# Patient Record
Sex: MALE | Race: WHITE | NOT HISPANIC OR LATINO | Employment: FULL TIME | URBAN - METROPOLITAN AREA
[De-identification: names, ages, dates, MRNs, and addresses within clinical notes are randomized per-mention and may not be internally consistent; named-entity substitution may affect disease eponyms.]

---

## 2020-05-19 ENCOUNTER — TELEPHONE (OUTPATIENT)
Dept: SCHEDULING | Facility: IMAGING CENTER | Age: 35
End: 2020-05-19

## 2020-06-08 ENCOUNTER — OFFICE VISIT (OUTPATIENT)
Dept: MEDICAL GROUP | Facility: PHYSICIAN GROUP | Age: 35
End: 2020-06-08
Payer: COMMERCIAL

## 2020-06-08 VITALS
OXYGEN SATURATION: 96 % | HEART RATE: 76 BPM | WEIGHT: 283 LBS | DIASTOLIC BLOOD PRESSURE: 80 MMHG | HEIGHT: 69 IN | RESPIRATION RATE: 20 BRPM | SYSTOLIC BLOOD PRESSURE: 128 MMHG | BODY MASS INDEX: 41.92 KG/M2 | TEMPERATURE: 97.4 F

## 2020-06-08 DIAGNOSIS — E78.5 DYSLIPIDEMIA: ICD-10-CM

## 2020-06-08 DIAGNOSIS — Z00.00 PHYSICAL EXAM, ANNUAL: ICD-10-CM

## 2020-06-08 DIAGNOSIS — K76.0 FATTY LIVER: ICD-10-CM

## 2020-06-08 DIAGNOSIS — E66.01 CLASS 3 SEVERE OBESITY DUE TO EXCESS CALORIES WITHOUT SERIOUS COMORBIDITY WITH BODY MASS INDEX (BMI) OF 40.0 TO 44.9 IN ADULT (HCC): ICD-10-CM

## 2020-06-08 DIAGNOSIS — E11.9 TYPE 2 DIABETES MELLITUS WITHOUT COMPLICATION, WITHOUT LONG-TERM CURRENT USE OF INSULIN (HCC): ICD-10-CM

## 2020-06-08 PROBLEM — E66.813 CLASS 3 SEVERE OBESITY DUE TO EXCESS CALORIES WITHOUT SERIOUS COMORBIDITY WITH BODY MASS INDEX (BMI) OF 40.0 TO 44.9 IN ADULT (HCC): Status: ACTIVE | Noted: 2020-06-08

## 2020-06-08 LAB
HBA1C MFR BLD: 6.9 % (ref 0–5.6)
INT CON NEG: NEGATIVE
INT CON POS: POSITIVE

## 2020-06-08 PROCEDURE — 83036 HEMOGLOBIN GLYCOSYLATED A1C: CPT | Performed by: PHYSICIAN ASSISTANT

## 2020-06-08 PROCEDURE — 99204 OFFICE O/P NEW MOD 45 MIN: CPT | Performed by: PHYSICIAN ASSISTANT

## 2020-06-08 RX ORDER — VILAZODONE HYDROCHLORIDE 40 MG/1
40 TABLET ORAL DAILY
COMMUNITY
End: 2021-07-13 | Stop reason: SDUPTHER

## 2020-06-08 RX ORDER — TOPIRAMATE 100 MG/1
100 TABLET, FILM COATED ORAL DAILY
COMMUNITY
End: 2021-04-19

## 2020-06-08 RX ORDER — ATORVASTATIN CALCIUM 10 MG/1
10 TABLET, FILM COATED ORAL DAILY
Qty: 90 TAB | Refills: 0 | Status: SHIPPED | OUTPATIENT
Start: 2020-06-08 | End: 2020-10-05

## 2020-06-08 RX ORDER — LIOTHYRONINE SODIUM 5 UG/1
15 TABLET ORAL DAILY
COMMUNITY
End: 2021-04-19

## 2020-06-08 RX ORDER — LAMOTRIGINE 100 MG/1
100 TABLET ORAL 2 TIMES DAILY
COMMUNITY
End: 2021-07-13 | Stop reason: SDUPTHER

## 2020-06-08 ASSESSMENT — PATIENT HEALTH QUESTIONNAIRE - PHQ9: CLINICAL INTERPRETATION OF PHQ2 SCORE: 0

## 2020-06-08 NOTE — PROGRESS NOTES
CC:    Chief Complaint   Patient presents with   • [facility] Care       HISTORY OF THE PRESENT ILLNESS: Patient is a 35 y.o. male presenting to [facility] primary care     1. Pt has bipolar type 2 that is managed by psych.   2. Pt had blood work done 5 months ago that had abnormal labs. He notes that his fasting glucose was 170.  Patient's labs indicate that he had a fasting glucose of 173 and hemoglobin A1c of 6.1%.   3. Pt is prediabetic and has been on metformin once daily for years.  His hemoglobin A1c today is 6.9%.  4. Pt takes liothyronine 5mcg. He does not remember who started him on it and his TSH was on the low end of normal. He was having fatigue and weight gain so he was started on medication.   5. Pt reports severe RIK. He just had a sleep study last week and will be switching to BIPAP machine.   6. Pt has hx of elevated enzymes since he was a teenager. He had work up and diagnosed with fatty liver disease.     No problem-specific Assessment & Plan notes found for this encounter.    Allergies: Patient has no known allergies.    Current Outpatient Medications Ordered in Epic   Medication Sig Dispense Refill   • metFORMIN (GLUCOPHAGE) 500 MG Tab Take 500 mg by mouth every day.     • lamoTRIgine (LAMICTAL) 100 MG Tab Take 100 mg by mouth 2 times a day. 100mg am and 200mg pm     • topiramate (TOPAMAX) 100 MG Tab Take 100 mg by mouth every day. 3 tabs qhs     • liothyronine (CYTOMEL) 5 MCG Tab Take 5 mcg by mouth every day. 3 tabs once day     • Vilazodone HCl (VIIBRYD) 40 MG Tab Take 40 mg by mouth every day.     • VITAMIN D PO Take 2,000 mg by mouth.       No current Epic-ordered facility-administered medications on file.        Past Medical History:   Diagnosis Date   • Bipolar 2 disorder (HCC)    • Diabetes (HCC)    • Sleep apnea    • Thyroid disease        History reviewed. No pertinent surgical history.    Social History     Tobacco Use   • Smoking status: Former Smoker     Last attempt to quit:  "2014     Years since quittin.0   • Smokeless tobacco: Never Used   Substance Use Topics   • Alcohol use: Not Currently     Comment: rare   • Drug use: Never       Social History     Social History Narrative   • Not on file       Family History   Problem Relation Age of Onset   • No Known Problems Mother        ROS:     - Constitutional: Negative for fever, chills, unexpected weight change, and fatigue/generalized weakness.     - HEENT: Negative for headaches, vision changes, hearing changes, ear pain, ear discharge, rhinorrhea, sinus congestion, sore throat, and neck pain.      - Respiratory: Negative for cough, sputum production, chest congestion, dyspnea, wheezing, and crackles.      - Cardiovascular: Negative for chest pain, palpitations, orthopnea, and bilateral lower extremity edema.     - Gastrointestinal: Negative for heartburn, nausea, vomiting, abdominal pain, hematochezia, melena, diarrhea, constipation, and greasy/foul-smelling stools.     - Genitourinary: Negative for dysuria, polyuria, hematuria, pyuria, urinary urgency, and urinary incontinence.     - Musculoskeletal:Positive for b/l knee pain. Negative for myalgias, back pain    - Skin: Negative for rash, itching, cyanotic skin color change.     - Neurological: Negative for dizziness, tingling, tremors, focal sensory deficit, focal weakness and headaches.     - Endo/Heme/Allergies: Does not bruise/bleed easily.     - Psychiatric/Behavioral: Positive for anxiety and depression. Negative forsuicidal/homicidal ideation and memory loss.        - NOTE: All other systems reviewed and are negative, except as in HPI.          Exam: /80 (BP Location: Left arm, Patient Position: Sitting, BP Cuff Size: Adult long)   Pulse 76   Temp 36.3 °C (97.4 °F) (Temporal)   Resp 20   Ht 1.74 m (5' 8.5\")   Wt (!) 128.4 kg (283 lb)   SpO2 96%  Body mass index is 42.4 kg/m².    General: Normal appearing. No acute distress.Obese.   Skin: Warm and dry.  No " obvious lesions.  HEENT: Normocephalic. Eyes conjunctiva clear lids without ptosis, ears normal shape and contour  Cardiovascular: Regular rate and rhythm without murmur.   Respiratory: Clear to auscultation bilaterally, no rhonchi wheezing or rales.  Neurologic: Grossly nonfocal, A&O x3, gait normal,  Musculoskeletal: No deformity or swelling.   Extremities: No extremity cyanosis, clubbing, or edema.  Psych: Normal mood and affect. Judgment and insight is normal.    Please note that this dictation was created using voice recognition software. I have made every reasonable attempt to correct obvious errors, but I expect that there are errors of grammar and possibly content that I did not discover before finalizing the note.    LABS: 6/8/2020: Results reviewed and discussed with the patient, questions answered.      Assessment/Plan    1. Type 2 diabetes mellitus without complication, without long-term current use of insulin (HCC)  I explained to him that he is now full-fledged diabetic with a hemoglobin A1c of 6.9%.  I am going to increase his metformin to twice daily and refer him to the weight loss clinic.  I am optimistic that he will be able to reverse his diabetes and get off of his cholesterol and diabetes medications if he drops weight.  - REFERRAL TO Replaced by [facility] (HIP) Services Requested: [facility] Staff to Evaluate Best Program; Reason for Visit: Overweight/Obesity, Medical Condition Requiring Nutrition Counseling  - POCT Hemoglobin A1C  - metFORMIN (GLUCOPHAGE) 500 MG Tab; Take 1 Tab by mouth 2 times a day, with meals for 90 days.  Dispense: 180 Tab; Refill: 0    2. Dyslipidemia  I will start him on low-dose Lipitor for now and recheck his lipids in 3 months.  - Lipid Profile; Future  - atorvastatin (LIPITOR) 10 MG Tab; Take 1 Tab by mouth every day for 90 days.  Dispense: 90 Tab; Refill: 0    3. Class 3 severe obesity due to excess calories without serious comorbidity with body mass  index (BMI) of 40.0 to 44.9 in adult (HCC)  Referral to health improvement program sent  - REFERRAL TO [facility] IMPROVEMENT PROGRAMS (HIP) Services Requested: General-HIP Staff to Evaluate Best Program; Reason for Visit: Overweight/Obesity, Medical Condition Requiring Nutrition Counseling    4. Physical exam, annual  Annual labs printed.  I will see him again in 3 months  - Lipid Profile; Future  - TSH WITH REFLEX TO FT4; Future    5. Fatty liver  Explained to him that weight loss will improve his fatty liver.

## 2020-07-07 ENCOUNTER — OFFICE VISIT (OUTPATIENT)
Dept: HEALTH INFORMATION MANAGEMENT | Facility: MEDICAL CENTER | Age: 35
End: 2020-07-07
Payer: COMMERCIAL

## 2020-07-07 VITALS
OXYGEN SATURATION: 96 % | WEIGHT: 278.7 LBS | HEART RATE: 69 BPM | BODY MASS INDEX: 41.28 KG/M2 | DIASTOLIC BLOOD PRESSURE: 74 MMHG | HEIGHT: 69 IN | SYSTOLIC BLOOD PRESSURE: 118 MMHG

## 2020-07-07 DIAGNOSIS — F41.9 ANXIETY: ICD-10-CM

## 2020-07-07 DIAGNOSIS — G47.33 OSA (OBSTRUCTIVE SLEEP APNEA): ICD-10-CM

## 2020-07-07 DIAGNOSIS — E66.01 CLASS 3 SEVERE OBESITY DUE TO EXCESS CALORIES WITHOUT SERIOUS COMORBIDITY WITH BODY MASS INDEX (BMI) OF 40.0 TO 44.9 IN ADULT (HCC): ICD-10-CM

## 2020-07-07 DIAGNOSIS — K76.0 FATTY LIVER: ICD-10-CM

## 2020-07-07 DIAGNOSIS — E55.9 VITAMIN D DEFICIENCY: ICD-10-CM

## 2020-07-07 DIAGNOSIS — E11.9 TYPE 2 DIABETES MELLITUS WITHOUT COMPLICATION, WITHOUT LONG-TERM CURRENT USE OF INSULIN (HCC): ICD-10-CM

## 2020-07-07 DIAGNOSIS — E78.5 DYSLIPIDEMIA: ICD-10-CM

## 2020-07-07 DIAGNOSIS — R53.82 CHRONIC FATIGUE: ICD-10-CM

## 2020-07-07 DIAGNOSIS — R74.01 ELEVATED ALT MEASUREMENT: ICD-10-CM

## 2020-07-07 PROCEDURE — 99999 PR NO CHARGE: CPT | Performed by: INTERNAL MEDICINE

## 2020-07-07 PROCEDURE — 93000 ELECTROCARDIOGRAM COMPLETE: CPT | Performed by: INTERNAL MEDICINE

## 2020-07-07 PROCEDURE — 99204 OFFICE O/P NEW MOD 45 MIN: CPT | Performed by: INTERNAL MEDICINE

## 2020-07-07 NOTE — PROGRESS NOTES
Bariatric Medicine H&P  Chief Complaint   Patient presents with   • Weight Gain       Referred by:  Roberto Barakat P.A.*    History of Present Illness:   Leroy Naylor is a 35 y.o.  male who presents for weight management and to help address co-morbidities caused by overweight, as below.    Patient wants to feel better about himself, set an example for his daughter.  He works long hours when he works, cannot eat all day during those times and has signed an agreement that he will not take breaks.  He has tried medical weight loss with Topamax, has been on for 3 years to no effect.  Diet and exercise, calorie counting has been easiest.  He has used my fitness pal in 2019, intake was around 1400 kcals per day but has not sustain that long-term.  He admits he eats a lot of carbohydrates, fast food after work and when he is not working.  He is too tired to cook meals.    He is interested in trying anti-obesity medication if he could afford it but has financial constraints.  He is interested in meal replacement shakes which he might be able to work and do his work schedule.    Brief Diet History (see also RD notes):  AM: Usually skips  Lunch: Frozen meal or takeout  Dinner: Frozen meal or takeout  Only eats dinner on workdays, no other meals  Snacks: Nuts, granola, pretzels, crackers  Drinks: Coffee, energy drinks    HLD: On daily statin, just started  T2DM: On metformin just increased to twice daily  Fatty liver: Elevated ALT 59, 1/2020    Behavior-Related History:  Binge eating screen: Positive at times  H/o abuse: Negative     Exercise:   Likes to walk his dogs and hike, about once a week     Review of Systems   Positive for chronic fatigue, lack of energy, depression, anxiety  Sleep apnea screen: Strongly positive, waiting for better fitting CPAP  All other ROS were reviewed with patient today and are negative.      PMH/PSH:  I have reviewed the patient's medical, social and family history, allergies,  "and medications today.  Prior records reviewed.  Personal Hx of Bariatric Surgery: Negative  His mother had bariatric surgery, had a positive experience but patient does not want to consider at this time      Physical Exam:   /74 (BP Location: Left arm, Patient Position: Sitting, BP Cuff Size: Large adult long)   Pulse 69   Ht 1.753 m (5' 9\")   Wt (!) 126.4 kg (278 lb 11.2 oz)   SpO2 96%   BMI 41.16 kg/m²   Waist Measurement   Waist: 52.75 inch/inches  Body fat % 43.7  REE 2323 kcal/day    Constitutional: Oriented to person, place, and time and well-developed, well-nourished, and in no distress.    HENT: No facial plethora.  No Cushingoid features.  No scalloped tongue.  No dental erosions.  No swollen parotids.  Head: Normocephalic.   Eyes: EOM are normal. Pupils are equal, round, and reactive to light. No periorbital edema.  No lateral thinning of eyebrows.  No vertical nystagmus.  Neck: Normal range of motion. Neck supple. No thyromegaly present. No buffalo hump.  Cardiovascular: Normal rate and regular rhythm.  No murmur heard.  Pulmonary/Chest: Effort normal and breath sounds normal. No wheezes.   Abdominal: Soft. Bowel sounds are normal.  Grade 2 pannus.  No ascites.  No hepatosplenomegaly.   Musculoskeletal: Normal range of motion. No edema.   Neurological: Alert and oriented to person, place, and time. Normal reflexes. No cranial nerve deficit. No muscle weakness.  Gait normal.   Skin: Warm and dry. Not diaphoretic. No hirsuitism.  No acanthosis nigricans.  Not excessively dry, scaly.  No acne.  No bruising/ecchymosis.   No xanthomas or acrochordon.  Multiple arm tattoos  Psychiatric: Mood, memory, affect and judgment normal.     Laboratory:   Prior labs reviewed.  EKG: Sinus bradycardia, rate 54, no ST-T abnormalities.  Corrected QT 0.381  Ordered, performed in our office today, and reviewed by me today.    Dietitian Assessment: I have reviewed the Dietitian's assessment " related to this encounter.       ASSESSMENT/PLAN:  Body mass index is 41.16 kg/m².   Obesity Stage ([place]) 2; Class 3    1. Class 3 severe obesity due to excess calories without serious comorbidity with body mass index (BMI) of 40.0 to 44.9 in adult (HCC)     2. Dyslipidemia  EKG   3. Type 2 diabetes mellitus without complication, without long-term current use of insulin (HCC)     4. Fatty liver     5. RIK (obstructive sleep apnea)     6. Anxiety     7. Chronic fatigue  EKG   8. Elevated ALT measurement     9. Vitamin D deficiency         Patient has several significant comorbidities related to his chronic overweight.  He finds it difficult to address these given his work schedule and cannot change his work schedule at this time.  Start tracking if possible, work on refined CHO reduction, work on timing of meals if possible.  Consider GLP-1 agonist given T2DM but just increase metformin.  Continue statin, Vit D supp.  Monitor sleep, fatigue, LFTs, mood he progresses through the program.    The patient and I have discussed at length and agree to the following recommendations, which are all addressing the above diagnoses:    Weight Goal: 5% wt loss at one month after start (pt goal weight is 190 lb)  Diet:   12-hour work schedule, cannot eat during those hours  MR: Try 1 ND every morning or equivalent to start  High Protein/Low Carb Meals and 2 snacks between meals daily  >100 g protein, <100 g total carbs daily, less than 1800 kcal per day to start  Track daily intake with My Fitness Pal, bring to next visit  64+ oz water per day  Avoid high-calorie energy drinks, 1 meal per day p.m.  Physical Activity: Hiking, walking on days off, set goals next visit  Risk level for moderate/vigorous exercise program:   Moderate given chronic fatigue  New Rx:   Consider GLP-1 agonist pending course  Financial constraints limit use of Contrave  Behavior change:   Regular psychiatry follow-up given bipolar  Follow-up: one month  with MD, 2 wks with RD      Patient's body mass index is 41.16 kg/m². Exercise and nutrition counseling were performed at this visit.        Thank you for your referral!

## 2020-09-30 DIAGNOSIS — E78.5 DYSLIPIDEMIA: ICD-10-CM

## 2020-10-01 ENCOUNTER — HOSPITAL ENCOUNTER (OUTPATIENT)
Dept: LAB | Facility: MEDICAL CENTER | Age: 35
End: 2020-10-01
Attending: PHYSICIAN ASSISTANT
Payer: COMMERCIAL

## 2020-10-01 DIAGNOSIS — E78.5 DYSLIPIDEMIA: ICD-10-CM

## 2020-10-01 DIAGNOSIS — Z00.00 PHYSICAL EXAM, ANNUAL: ICD-10-CM

## 2020-10-01 LAB
CHOLEST SERPL-MCNC: 157 MG/DL (ref 100–199)
FASTING STATUS PATIENT QL REPORTED: NORMAL
HDLC SERPL-MCNC: 41 MG/DL
LDLC SERPL CALC-MCNC: 97 MG/DL
TRIGL SERPL-MCNC: 95 MG/DL (ref 0–149)

## 2020-10-01 PROCEDURE — 84443 ASSAY THYROID STIM HORMONE: CPT

## 2020-10-01 PROCEDURE — 80061 LIPID PANEL: CPT

## 2020-10-01 PROCEDURE — 36415 COLL VENOUS BLD VENIPUNCTURE: CPT

## 2020-10-02 LAB — TSH SERPL DL<=0.005 MIU/L-ACNC: 1.87 UIU/ML (ref 0.38–5.33)

## 2020-10-05 RX ORDER — ATORVASTATIN CALCIUM 10 MG/1
TABLET, FILM COATED ORAL
Qty: 90 TAB | Refills: 0 | Status: SHIPPED | OUTPATIENT
Start: 2020-10-05 | End: 2021-04-13

## 2020-10-06 ENCOUNTER — OFFICE VISIT (OUTPATIENT)
Dept: MEDICAL GROUP | Facility: PHYSICIAN GROUP | Age: 35
End: 2020-10-06
Payer: COMMERCIAL

## 2020-10-06 VITALS
OXYGEN SATURATION: 95 % | RESPIRATION RATE: 12 BRPM | HEIGHT: 69 IN | DIASTOLIC BLOOD PRESSURE: 80 MMHG | HEART RATE: 79 BPM | BODY MASS INDEX: 42 KG/M2 | SYSTOLIC BLOOD PRESSURE: 120 MMHG | WEIGHT: 283.6 LBS | TEMPERATURE: 97.7 F

## 2020-10-06 DIAGNOSIS — R53.82 CHRONIC FATIGUE: ICD-10-CM

## 2020-10-06 DIAGNOSIS — Z00.00 PHYSICAL EXAM, ANNUAL: ICD-10-CM

## 2020-10-06 DIAGNOSIS — G47.33 OSA (OBSTRUCTIVE SLEEP APNEA): ICD-10-CM

## 2020-10-06 DIAGNOSIS — E78.5 DYSLIPIDEMIA: ICD-10-CM

## 2020-10-06 DIAGNOSIS — E66.01 CLASS 3 SEVERE OBESITY DUE TO EXCESS CALORIES WITHOUT SERIOUS COMORBIDITY WITH BODY MASS INDEX (BMI) OF 40.0 TO 44.9 IN ADULT (HCC): ICD-10-CM

## 2020-10-06 DIAGNOSIS — E11.9 TYPE 2 DIABETES MELLITUS WITHOUT COMPLICATION, WITHOUT LONG-TERM CURRENT USE OF INSULIN (HCC): ICD-10-CM

## 2020-10-06 LAB
HBA1C MFR BLD: 6.4 % (ref 0–5.6)
INT CON NEG: NEGATIVE
INT CON POS: POSITIVE

## 2020-10-06 PROCEDURE — 99395 PREV VISIT EST AGE 18-39: CPT | Performed by: PHYSICIAN ASSISTANT

## 2020-10-06 PROCEDURE — 83036 HEMOGLOBIN GLYCOSYLATED A1C: CPT | Performed by: PHYSICIAN ASSISTANT

## 2020-10-06 RX ORDER — MODAFINIL 100 MG/1
100 TABLET ORAL DAILY
COMMUNITY
Start: 2020-09-30 | End: 2021-05-07

## 2020-10-06 NOTE — PROGRESS NOTES
CC:    Chief Complaint   Patient presents with   • Follow-Up       HISTORY OF THE PRESENT ILLNESS:  35 y.o. male presenting for annual physical exam.   1. Pt just started on Provigil 3 days ago. He has severe RIK. Helping with day time energy levels.   2. Pt's A1C today is 6.4%. Increased metformin to BID at last visit.   3. Pt saw Dr Buchanan but states that financial constraints are hindering him.   4. Pt stopped taking liothyronine.   5. Pt's lipids much improved.       No problem-specific Assessment & Plan notes found for this encounter.    Allergies: Patient has no known allergies.    Current Outpatient Medications Ordered in Epic   Medication Sig Dispense Refill   • modafinil (PROVIGIL) 100 MG Tab Take 100 mg by mouth every day. Take every morning     • atorvastatin (LIPITOR) 10 MG Tab TAKE ONE TABLET BY MOUTH DAILY 90 Tab 0   • lamoTRIgine (LAMICTAL) 100 MG Tab Take 100 mg by mouth 2 times a day. 100mg am and 200mg pm     • topiramate (TOPAMAX) 100 MG Tab Take 100 mg by mouth every day. 3 tabs qhs     • Vilazodone HCl (VIIBRYD) 40 MG Tab Take 40 mg by mouth every day.     • VITAMIN D PO Take 2,000 mg by mouth.     • liothyronine (CYTOMEL) 5 MCG Tab Take 15 mcg by mouth every day. 3 tabs once day        No current Epic-ordered facility-administered medications on file.        Past Medical History:   Diagnosis Date   • Bipolar 2 disorder (HCC)    • Diabetes (HCC)    • Sleep apnea    • Thyroid disease        History reviewed. No pertinent surgical history.    Social History     Tobacco Use   • Smoking status: Former Smoker     Quit date: 2014     Years since quittin.3   • Smokeless tobacco: Never Used   Substance Use Topics   • Alcohol use: Not Currently     Comment: rare   • Drug use: Never       Social History     Social History Narrative   • Not on file       Family History   Problem Relation Age of Onset   • No Known Problems Mother        ROS:     - Constitutional:Positive for fatigue. Negative  "for fever, chills, unexpected weight change, and generalized weakness.     - HEENT: Negative for headaches, vision changes, hearing changes, ear pain, ear discharge, rhinorrhea, sinus congestion, sore throat, and neck pain.      - Respiratory: Negative for cough, sputum production, chest congestion, dyspnea, wheezing, and crackles.      - Cardiovascular: Negative for chest pain, palpitations, orthopnea, and bilateral lower extremity edema.     - Gastrointestinal: Negative for heartburn, nausea, vomiting, abdominal pain, hematochezia, melena, diarrhea, constipation, and greasy/foul-smelling stools.     - Genitourinary: Negative for dysuria, polyuria, hematuria, pyuria, urinary urgency, and urinary incontinence.     - Musculoskeletal: Negative for myalgias, back pain, and joint pain.     - Skin: Negative for rash, itching, cyanotic skin color change.     - Neurological: Negative for dizziness, tingling, tremors, focal sensory deficit, focal weakness and headaches.     - Endo/Heme/Allergies: Does not bruise/bleed easily.     - Psychiatric/Behavioral: Positive for depression. Negative for  suicidal/homicidal ideation and memory loss.        - NOTE: All other systems reviewed and are negative, except as in HPI.      .      Exam: /80 (BP Location: Left arm, Patient Position: Sitting, BP Cuff Size: Adult)   Pulse 79   Temp 36.5 °C (97.7 °F) (Temporal)   Resp 12   Ht 1.753 m (5' 9\")   Wt (!) 128.6 kg (283 lb 9.6 oz)   SpO2 95%  Body mass index is 41.88 kg/m².    General: Normal appearing. No distress.  HEENT: Normocephalic. Eyes conjunctiva clear lids without ptosis, pupils equal and reactive to light accommodation, ears normal shape and contour, canals are clear bilaterally, tympanic membranes are benign, nasal mucosa benign, oropharynx is without erythema, edema or exudates.   Neck: Supple without JVD or bruit. No thyromegaly. No cervical lymphadenopathy  Pulmonary: Clear to ausculation.  Normal effort. No " rales, ronchi, or wheezing.  Cardiovascular: Regular rate and rhythm without murmur, gallops or rubs  Neurologic: Grossly nonfocal, gait normal, normal muscle tone  Skin: Warm and dry.  No obvious lesions.  Musculoskeletal: No extremity cyanosis, clubbing, or edema. No deformity  Psych: Normal mood and affect. Alert and oriented x3. Judgment and insight is normal.    Please note that this dictation was created using voice recognition software. I have made every reasonable attempt to correct obvious errors, but I expect that there are errors of grammar and possibly content that I did not discover before finalizing the note.    LABS: 10/6/2020: Results reviewed and discussed with the patient, questions answered.    Assessment/Plan  1. Type 2 diabetes mellitus without complication, without long-term current use of insulin (HCC)  Well-controlled with metformin twice daily.  We will continue with this and recheck again in 6 months  - POCT A1C  - POCT Retinal Eye Exam    2. RIK (obstructive sleep apnea)  Continue with Provigil.    3. Class 3 severe obesity due to excess calories without serious comorbidity with body mass index (BMI) of 40.0 to 44.9 in adult ([facility])  Continue to try to lose weight.    4. Dyslipidemia  Lipids are much improved so we will continue with Lipitor 20 mg    5. Chronic fatigue  See #2    6. Physical exam, annual  PE conducted

## 2020-12-11 DIAGNOSIS — E11.9 TYPE 2 DIABETES MELLITUS WITHOUT COMPLICATION, WITHOUT LONG-TERM CURRENT USE OF INSULIN (HCC): ICD-10-CM

## 2021-01-22 ENCOUNTER — OFFICE VISIT (OUTPATIENT)
Dept: URGENT CARE | Facility: CLINIC | Age: 36
End: 2021-01-22
Payer: COMMERCIAL

## 2021-01-22 VITALS
RESPIRATION RATE: 14 BRPM | DIASTOLIC BLOOD PRESSURE: 120 MMHG | TEMPERATURE: 98.6 F | WEIGHT: 273 LBS | BODY MASS INDEX: 40.43 KG/M2 | SYSTOLIC BLOOD PRESSURE: 170 MMHG | HEART RATE: 98 BPM | OXYGEN SATURATION: 95 % | HEIGHT: 69 IN

## 2021-01-22 DIAGNOSIS — L60.0 INGROWN TOENAIL OF RIGHT FOOT: ICD-10-CM

## 2021-01-22 DIAGNOSIS — I16.0 HYPERTENSIVE URGENCY: ICD-10-CM

## 2021-01-22 DIAGNOSIS — L08.9 TOE INFECTION: ICD-10-CM

## 2021-01-22 PROCEDURE — 99214 OFFICE O/P EST MOD 30 MIN: CPT | Performed by: PHYSICIAN ASSISTANT

## 2021-01-22 RX ORDER — SULFAMETHOXAZOLE AND TRIMETHOPRIM 800; 160 MG/1; MG/1
1 TABLET ORAL 2 TIMES DAILY
Qty: 20 TAB | Refills: 0 | Status: SHIPPED | OUTPATIENT
Start: 2021-01-22 | End: 2021-02-01

## 2021-01-22 ASSESSMENT — ENCOUNTER SYMPTOMS
BLURRED VISION: 0
HEADACHES: 1
EYE PAIN: 0
NERVOUS/ANXIOUS: 1
DIZZINESS: 1
DOUBLE VISION: 0
CHILLS: 0
FEVER: 0
PALPITATIONS: 0

## 2021-01-22 NOTE — PROGRESS NOTES
Subjective:   Leroy Naylor is a 35 y.o. male who presents today with   Chief Complaint   Patient presents with   • Toe Pain     right big toe       Toe Injury  This is a new problem. The current episode started more than 1 month ago. The problem occurs constantly. The problem has been unchanged. Associated symptoms include headaches. Pertinent negatives include no chest pain, chills or fever. Nothing aggravates the symptoms. He has tried nothing for the symptoms. The treatment provided no relief.     Patient initially comes in for right great toe pain.  Upon measuring his blood pressure it was found to be extremely high.  Remeasured the blood pressure at the end of the visit and it was still high.  Patient states he did take a course of Keflex and it did help but has come back since.  PMH:  has a past medical history of Bipolar 2 disorder (HCC), Diabetes (HCC), Sleep apnea, and Thyroid disease.  MEDS:   Current Outpatient Medications:   •  sulfamethoxazole-trimethoprim (BACTRIM DS) 800-160 MG tablet, Take 1 Tab by mouth 2 times a day for 10 days., Disp: 20 Tab, Rfl: 0  •  metFORMIN (GLUCOPHAGE) 500 MG Tab, TAKE ONE TABLET BY MOUTH TWICE A DAY WITH A MEAL, Disp: 180 Tab, Rfl: 0  •  modafinil (PROVIGIL) 100 MG Tab, Take 100 mg by mouth every day. Take every morning, Disp: , Rfl:   •  atorvastatin (LIPITOR) 10 MG Tab, TAKE ONE TABLET BY MOUTH DAILY, Disp: 90 Tab, Rfl: 0  •  lamoTRIgine (LAMICTAL) 100 MG Tab, Take 100 mg by mouth 2 times a day. 100mg am and 200mg pm, Disp: , Rfl:   •  topiramate (TOPAMAX) 100 MG Tab, Take 100 mg by mouth every day. 3 tabs qhs, Disp: , Rfl:   •  liothyronine (CYTOMEL) 5 MCG Tab, Take 15 mcg by mouth every day. 3 tabs once day , Disp: , Rfl:   •  Vilazodone HCl (VIIBRYD) 40 MG Tab, Take 40 mg by mouth every day., Disp: , Rfl:   •  VITAMIN D PO, Take 2,000 mg by mouth., Disp: , Rfl:   ALLERGIES: No Known Allergies  SURGHX: History reviewed. No pertinent surgical history.  SOCHX:   "reports that he quit smoking about 6 years ago. He has never used smokeless tobacco. He reports previous alcohol use. He reports that he does not use drugs.  FH: Reviewed with patient, not pertinent to this visit.       Review of Systems   Constitutional: Negative for chills and fever.   Eyes: Negative for blurred vision, double vision and pain.   Cardiovascular: Negative for chest pain and palpitations.   Musculoskeletal:        Right great toe pain   Neurological: Positive for dizziness and headaches.   Psychiatric/Behavioral: The patient is nervous/anxious.         Objective:   BP (!) 170/120 (BP Location: Right arm, Patient Position: Sitting)   Pulse 98   Temp 37 °C (98.6 °F)   Resp 14   Ht 1.753 m (5' 9\")   Wt 123.8 kg (273 lb)   SpO2 95%   BMI 40.32 kg/m²   Physical Exam  Vitals signs and nursing note reviewed.   Constitutional:       General: He is not in acute distress.     Appearance: Normal appearance. He is well-developed. He is not ill-appearing or toxic-appearing.   HENT:      Head: Normocephalic and atraumatic.      Right Ear: Hearing normal.      Left Ear: Hearing normal.   Eyes:      Pupils: Pupils are equal, round, and reactive to light.   Cardiovascular:      Rate and Rhythm: Normal rate and regular rhythm.      Heart sounds: Normal heart sounds.   Pulmonary:      Effort: Pulmonary effort is normal.   Musculoskeletal:        Feet:       Comments: Full ROM , Less than 2 capillary refill, NVI distally in right great toe.   Skin:     General: Skin is warm and dry.      Comments: Purulent discharge to the right great toe with pressure. TTP to medial portion or right great toe.   Neurological:      Mental Status: He is alert.      Coordination: Coordination normal.   Psychiatric:         Mood and Affect: Mood normal.       Assessment/Plan:   Assessment    1. Ingrown toenail of right foot  - REFERRAL TO PODIATRY    2. Toe infection  - sulfamethoxazole-trimethoprim (BACTRIM DS) 800-160 MG tablet; " Take 1 Tab by mouth 2 times a day for 10 days.  Dispense: 20 Tab; Refill: 0    3. Hypertensive urgency  Recommend follow-up with podiatry for ingrown toenail.  We will treat for bacterial infection given pus from the toe.  Did recommend that patient go to the ER at this time for evaluation given that his blood pressure was significantly elevated and he is symptomatic.  He is comfortable with going by private vehicle and will have a friend take him there at this time immediately.  He is understanding of potential risk of blood pressure this high including but not limited to stroke, heart attack, organ damage.  Please note that this dictation was created using voice recognition software. I have made every reasonable attempt to correct obvious errors, but I expect that there are errors of grammar and possibly content that I did not discover before finalizing the note.    Eliezer Hernandez PA-C

## 2021-01-26 ENCOUNTER — OFFICE VISIT (OUTPATIENT)
Dept: MEDICAL GROUP | Facility: PHYSICIAN GROUP | Age: 36
End: 2021-01-26
Payer: COMMERCIAL

## 2021-01-26 VITALS
WEIGHT: 282.6 LBS | OXYGEN SATURATION: 96 % | HEART RATE: 106 BPM | HEIGHT: 69 IN | RESPIRATION RATE: 16 BRPM | TEMPERATURE: 98.4 F | DIASTOLIC BLOOD PRESSURE: 88 MMHG | SYSTOLIC BLOOD PRESSURE: 150 MMHG | BODY MASS INDEX: 41.86 KG/M2

## 2021-01-26 DIAGNOSIS — F43.0 ACUTE STRESS REACTION: ICD-10-CM

## 2021-01-26 DIAGNOSIS — I10 HYPERTENSION, UNSPECIFIED TYPE: ICD-10-CM

## 2021-01-26 PROCEDURE — 99213 OFFICE O/P EST LOW 20 MIN: CPT | Performed by: PHYSICIAN ASSISTANT

## 2021-01-26 RX ORDER — LISINOPRIL 10 MG/1
10 TABLET ORAL DAILY
Qty: 60 TAB | Refills: 0 | Status: SHIPPED | OUTPATIENT
Start: 2021-01-26 | End: 2021-04-27 | Stop reason: SDUPTHER

## 2021-01-26 RX ORDER — ALPRAZOLAM 0.5 MG/1
0.5 TABLET ORAL NIGHTLY PRN
COMMUNITY
End: 2021-08-16

## 2021-01-26 ASSESSMENT — PATIENT HEALTH QUESTIONNAIRE - PHQ9: CLINICAL INTERPRETATION OF PHQ2 SCORE: 0

## 2021-01-26 NOTE — PROGRESS NOTES
CC:  Chief Complaint   Patient presents with   • Hypertension Follow-up       HISTORY OF PRESENT ILLNESS: Patient is a 35 y.o. male established patient presenting for ER f/u.  1.  Patient was seen in urgent care last week for an infection in his toe and found to have a blood pressure of 170/120.  He was instructed by the provider in the urgent care to be evaluated at the ER so he went to [facility].  His blood pressure in the ER was 119/98.  He was told by the ER physician that because he was feeling fine and his blood pressure was not that bad that he would be fine to go home without any further work-up so he left.  He has been checking his blood pressure at home and for the past several days he has been averaging in the high 150s systolic.  He has never had a previous history of high blood pressure.  2. Pt having family troubles now that are very stressful. Has been very shaky and having generalized weakness. He will be switching back to his old job. He also recently increased his provigil to 200mg about a month ago.     No problem-specific Assessment & Plan notes found for this encounter.      Patient Active Problem List    Diagnosis Date Noted   • RIK (obstructive sleep apnea) 07/07/2020   • Anxiety 07/07/2020   • Chronic fatigue 07/07/2020   • Elevated ALT measurement 07/07/2020   • Vitamin D deficiency 07/07/2020   • Type 2 diabetes mellitus without complication, without long-term current use of insulin ([facility]) 06/08/2020   • Dyslipidemia 06/08/2020   • Class 3 severe obesity due to excess calories without serious comorbidity with body mass index (BMI) of 40.0 to 44.9 in adult ([facility]) 06/08/2020   • Fatty liver 06/08/2020      Allergies:Patient has no known allergies.    Current Outpatient Medications   Medication Sig Dispense Refill   • ALPRAZolam (XANAX) 0.5 MG Tab Take 0.5 mg by mouth at bedtime as needed for Sleep.     • sulfamethoxazole-trimethoprim (BACTRIM DS) 800-160 MG tablet Take 1 Tab by mouth 2 times  a day for 10 days. 20 Tab 0   • metFORMIN (GLUCOPHAGE) 500 MG Tab TAKE ONE TABLET BY MOUTH TWICE A DAY WITH A MEAL 180 Tab 0   • modafinil (PROVIGIL) 100 MG Tab Take 100 mg by mouth every day. Take every morning 200 mg     • atorvastatin (LIPITOR) 10 MG Tab TAKE ONE TABLET BY MOUTH DAILY 90 Tab 0   • lamoTRIgine (LAMICTAL) 100 MG Tab Take 100 mg by mouth 2 times a day. 100mg am and 200mg pm     • Vilazodone HCl (VIIBRYD) 40 MG Tab Take 40 mg by mouth every day.     • topiramate (TOPAMAX) 100 MG Tab Take 100 mg by mouth every day. 3 tabs qhs     • liothyronine (CYTOMEL) 5 MCG Tab Take 15 mcg by mouth every day. 3 tabs once day      • VITAMIN D PO Take 2,000 mg by mouth.       No current facility-administered medications for this visit.        Social History     Tobacco Use   • Smoking status: Former Smoker     Quit date: 2014     Years since quittin.6   • Smokeless tobacco: Never Used   Substance Use Topics   • Alcohol use: Not Currently     Comment: rare   • Drug use: Never     Social History     Social History Narrative   • Not on file       Family History   Problem Relation Age of Onset   • No Known Problems Mother         ROS:     - Constitutional:  Negative for fever, chills, unexpected weight change, and fatigue/generalized weakness.    - HEENT:  Negative for headaches, vision changes, hearing changes, ear pain, ear discharge, rhinorrhea, sinus congestion, sore throat, and neck pain.      - Respiratory: Negative for cough, sputum production, chest congestion, dyspnea, wheezing, and crackles.      - Cardiovascular: Negative for chest pain, palpitations, orthopnea, and bilateral lower extremity edema.     - Gastrointestinal: Negative for heartburn, nausea, vomiting, abdominal pain, hematochezia, melena, diarrhea, constipation, and greasy/foul-smelling stools.      - Neurological: Negative for dizziness, tingling, tremors, focal sensory deficit, focal weakness and headaches.     - Psychiatric/Behavioral:  "Positive for acute stress.  Negative for depression, suicidal/homicidal ideation and memory loss.              Exam:    /88 (BP Location: Right arm, Patient Position: Sitting, BP Cuff Size: Large adult)   Pulse (!) 106   Temp 36.9 °C (98.4 °F) (Temporal)   Resp 16   Ht 1.753 m (5' 9\")   Wt (!) 128.2 kg (282 lb 9.6 oz)   SpO2 96%  Body mass index is 41.73 kg/m².    General:  Well nourished, well developed male in NAD  Head is grossly normal.  Neck: Supple. Thyroid is not enlarged.  Pulmonary: Clear to auscultation. No ronchi, wheezing or rales  Cardiac: Regular rate and rhythm. No murmurs.  Skin: Warm and dry. No obvious lesions  Neuro: Normal muscle tone. Gait normal. Alert and oriented.  Psych: Normal mood and affect      Please note that this dictation was created using voice recognition software. I have made every reasonable attempt to correct obvious errors, but I expect that there are errors of grammar and possibly content that I did not discover before finalizing the note.      Assessment/Plan:  1. Hypertension, unspecified type  Up until this point he has had normal blood pressure readings in our office.  However because he has been getting elevated readings at home I am going to start him on a low-dose blood pressure medication and recheck him again for this in 2 months.  At that point we will consider discontinuing it if it is not necessary.  - lisinopril (PRINIVIL) 10 MG Tab; Take 1 Tab by mouth every day for 60 days.  Dispense: 60 Tab; Refill: 0    2. Acute stress reaction  The stress in his life is likely contributing to his blood pressure.  It is also noteworthy that he recently increased his modafinil dose to 200 mg so this could also be a contributing factor.          "

## 2021-03-08 ENCOUNTER — OFFICE VISIT (OUTPATIENT)
Dept: MEDICAL GROUP | Facility: PHYSICIAN GROUP | Age: 36
End: 2021-03-08
Payer: COMMERCIAL

## 2021-03-08 VITALS
RESPIRATION RATE: 16 BRPM | HEART RATE: 82 BPM | WEIGHT: 290.7 LBS | TEMPERATURE: 97.7 F | OXYGEN SATURATION: 95 % | BODY MASS INDEX: 43.06 KG/M2 | DIASTOLIC BLOOD PRESSURE: 82 MMHG | HEIGHT: 69 IN | SYSTOLIC BLOOD PRESSURE: 132 MMHG

## 2021-03-08 DIAGNOSIS — M54.6 ACUTE RIGHT-SIDED THORACIC BACK PAIN: ICD-10-CM

## 2021-03-08 PROCEDURE — 99213 OFFICE O/P EST LOW 20 MIN: CPT | Performed by: PHYSICIAN ASSISTANT

## 2021-03-08 NOTE — PROGRESS NOTES
CC:  Chief Complaint   Patient presents with   • Back Pain     x4-5 months, rt side       HISTORY OF PRESENT ILLNESS: Patient is a 35 y.o. male established patient presenting with back pain for the past 4-5 months. It is episodic. He saw a chiropractor about 2 months ago. He was told that he has a displaced rib in his thorax. Pain in R paraspinal region. Pain has been unbearable in the past 2 weeks. Some days the pain will bring him to tears. There was no trauma or inciting incident. Has been taking tramadol and robaxin without any relief. Certain positions tend to worsen the pain. Has been using a foam roller with some relief.            Patient Active Problem List    Diagnosis Date Noted   • RIK (obstructive sleep apnea) 07/07/2020   • Anxiety 07/07/2020   • Chronic fatigue 07/07/2020   • Elevated ALT measurement 07/07/2020   • Vitamin D deficiency 07/07/2020   • Type 2 diabetes mellitus without complication, without long-term current use of insulin ([facility]) 06/08/2020   • Dyslipidemia 06/08/2020   • Class 3 severe obesity due to excess calories without serious comorbidity with body mass index (BMI) of 40.0 to 44.9 in adult ([facility]) 06/08/2020   • Fatty liver 06/08/2020      Allergies:Patient has no known allergies.    Current Outpatient Medications   Medication Sig Dispense Refill   • ALPRAZolam (XANAX) 0.5 MG Tab Take 0.5 mg by mouth at bedtime as needed for Sleep.     • lisinopril (PRINIVIL) 10 MG Tab Take 1 Tab by mouth every day for 60 days. 60 Tab 0   • metFORMIN (GLUCOPHAGE) 500 MG Tab TAKE ONE TABLET BY MOUTH TWICE A DAY WITH A MEAL 180 Tab 0   • modafinil (PROVIGIL) 100 MG Tab Take 100 mg by mouth every day. Take every morning 200 mg     • atorvastatin (LIPITOR) 10 MG Tab TAKE ONE TABLET BY MOUTH DAILY 90 Tab 0   • lamoTRIgine (LAMICTAL) 100 MG Tab Take 100 mg by mouth 2 times a day. 100mg am and 200mg pm     • topiramate (TOPAMAX) 100 MG Tab Take 100 mg by mouth every day. 3 tabs qhs     • liothyronine  "(CYTOMEL) 5 MCG Tab Take 15 mcg by mouth every day. 3 tabs once day      • Vilazodone HCl (VIIBRYD) 40 MG Tab Take 40 mg by mouth every day.     • VITAMIN D PO Take 2,000 mg by mouth.       No current facility-administered medications for this visit.       Social History     Tobacco Use   • Smoking status: Former Smoker     Quit date: 2014     Years since quittin.7   • Smokeless tobacco: Never Used   Substance Use Topics   • Alcohol use: Not Currently     Comment: rare   • Drug use: Never     Social History     Social History Narrative   • Not on file       Family History   Problem Relation Age of Onset   • No Known Problems Mother         ROS:     - Constitutional:  Negative for fever, chills, unexpected weight change, and fatigue/generalized weakness.    - HEENT:  Negative for headaches, vision changes, hearing changes, ear pain, ear discharge, rhinorrhea, sinus congestion, sore throat, and neck pain.      - Respiratory: Negative for cough, sputum production, chest congestion, dyspnea, wheezing, and crackles.      - Cardiovascular: Negative for chest pain, palpitations, orthopnea, and bilateral lower extremity edema.      - Genitourinary: Negative for dysuria, polyuria, hematuria, pyuria, urinary urgency, and urinary incontinence.     - Musculoskeletal:Positive for back pain.  Negative for myalgias, and joint pain.     - Skin: Negative for rash, itching, cyanotic skin color change.     - Neurological: Negative for dizziness, tingling, tremors, focal sensory deficit, focal weakness and headaches.                  Exam:    /82 (BP Location: Left arm, Patient Position: Sitting, BP Cuff Size: Adult long)   Pulse 82   Temp 36.5 °C (97.7 °F) (Temporal)   Resp 16   Ht 1.753 m (5' 9\")   Wt (!) 132 kg (290 lb 11.2 oz)   SpO2 95%  Body mass index is 42.93 kg/m².    General:  Well nourished, well developed male in NAD  Head is grossly normal.  Neck: Supple. Thyroid is not enlarged.  Pulmonary: no " accessory muscle use  Back: no tenderness, area of discomfort in T9-T10 R paraspinal region  Skin: Warm and dry. No obvious lesions  Neuro: Normal muscle tone. Gait normal. Alert and oriented.  Psych: Normal mood and affect      Please note that this dictation was created using voice recognition software. I have made every reasonable attempt to correct obvious errors, but I expect that there are errors of grammar and possibly content that I did not discover before finalizing the note.        Assessment/Plan:  1. Acute right-sided thoracic back pain  This is a problem that waxes and wanes in intensity and seems to be worsening overall.  I am going to proceed with imaging and set up orthopedic consult.  - DX-THORACIC SPINE-2 VIEWS; Future  - MR-THORACIC SPINE-W/O; Future  - REFERRAL TO ORTHOPEDICS

## 2021-03-09 ENCOUNTER — APPOINTMENT (OUTPATIENT)
Dept: RADIOLOGY | Facility: IMAGING CENTER | Age: 36
End: 2021-03-09
Attending: PHYSICIAN ASSISTANT
Payer: COMMERCIAL

## 2021-03-09 ENCOUNTER — APPOINTMENT (OUTPATIENT)
Dept: URGENT CARE | Facility: CLINIC | Age: 36
End: 2021-03-09
Payer: COMMERCIAL

## 2021-03-09 DIAGNOSIS — M54.6 ACUTE RIGHT-SIDED THORACIC BACK PAIN: ICD-10-CM

## 2021-03-09 PROCEDURE — 72070 X-RAY EXAM THORAC SPINE 2VWS: CPT | Mod: TC | Performed by: PHYSICIAN ASSISTANT

## 2021-04-12 DIAGNOSIS — E78.5 DYSLIPIDEMIA: ICD-10-CM

## 2021-04-14 RX ORDER — ATORVASTATIN CALCIUM 10 MG/1
TABLET, FILM COATED ORAL
Qty: 90 TABLET | Refills: 0 | Status: SHIPPED | OUTPATIENT
Start: 2021-04-14 | End: 2021-12-21 | Stop reason: SDUPTHER

## 2021-04-19 ENCOUNTER — OFFICE VISIT (OUTPATIENT)
Dept: MEDICAL GROUP | Facility: PHYSICIAN GROUP | Age: 36
End: 2021-04-19
Payer: COMMERCIAL

## 2021-04-19 VITALS
WEIGHT: 299.2 LBS | SYSTOLIC BLOOD PRESSURE: 130 MMHG | RESPIRATION RATE: 16 BRPM | OXYGEN SATURATION: 95 % | HEART RATE: 99 BPM | DIASTOLIC BLOOD PRESSURE: 84 MMHG | TEMPERATURE: 96 F | HEIGHT: 69 IN | BODY MASS INDEX: 44.31 KG/M2

## 2021-04-19 DIAGNOSIS — E11.9 TYPE 2 DIABETES MELLITUS WITHOUT COMPLICATION, WITHOUT LONG-TERM CURRENT USE OF INSULIN (HCC): ICD-10-CM

## 2021-04-19 DIAGNOSIS — M54.6 ACUTE RIGHT-SIDED THORACIC BACK PAIN: ICD-10-CM

## 2021-04-19 DIAGNOSIS — I10 ESSENTIAL HYPERTENSION: ICD-10-CM

## 2021-04-19 DIAGNOSIS — Z00.00 PHYSICAL EXAM, ANNUAL: ICD-10-CM

## 2021-04-19 LAB
HBA1C MFR BLD: 6.9 % (ref 0–5.6)
INT CON NEG: NEGATIVE
INT CON POS: POSITIVE

## 2021-04-19 PROCEDURE — 99214 OFFICE O/P EST MOD 30 MIN: CPT | Performed by: PHYSICIAN ASSISTANT

## 2021-04-19 PROCEDURE — 83036 HEMOGLOBIN GLYCOSYLATED A1C: CPT | Performed by: PHYSICIAN ASSISTANT

## 2021-04-19 RX ORDER — MELOXICAM 7.5 MG/1
7.5 TABLET ORAL DAILY
COMMUNITY
End: 2021-08-16

## 2021-04-19 RX ORDER — CYCLOBENZAPRINE HCL 10 MG
10 TABLET ORAL PRN
COMMUNITY
End: 2021-05-07

## 2021-04-19 NOTE — PROGRESS NOTES
CC:  Chief Complaint   Patient presents with   • Follow-Up       HISTORY OF PRESENT ILLNESS: Patient is a 35 y.o. male established patient presenting for follow-up  1. Pt's back pain has been OK in past few weeks. He had appt with [facility] and was told that he should do physical therapy. His first PT appt is today. Pain continues to wax and wane.   2. Pt has continued to take lisinopril daily. His home readings have been good.   3. Pt's A1C today is 6.9%. currently on metformin 500mg BID.   4. Patient continues to have a lot of stress at home.    No problem-specific Assessment & Plan notes found for this encounter.      Patient Active Problem List    Diagnosis Date Noted   • RIK (obstructive sleep apnea) 07/07/2020   • Anxiety 07/07/2020   • Chronic fatigue 07/07/2020   • Elevated ALT measurement 07/07/2020   • Vitamin D deficiency 07/07/2020   • Type 2 diabetes mellitus without complication, without long-term current use of insulin ([facility]) 06/08/2020   • Dyslipidemia 06/08/2020   • Class 3 severe obesity due to excess calories without serious comorbidity with body mass index (BMI) of 40.0 to 44.9 in adult ([facility]) 06/08/2020   • Fatty liver 06/08/2020      Allergies:Patient has no known allergies.    Current Outpatient Medications   Medication Sig Dispense Refill   • meloxicam (MOBIC) 7.5 MG Tab Take 7.5 mg by mouth every day.     • cyclobenzaprine (FLEXERIL) 10 mg Tab Take 10 mg by mouth as needed.     • atorvastatin (LIPITOR) 10 MG Tab TAKE ONE TABLET BY MOUTH DAILY 90 tablet 0   • ALPRAZolam (XANAX) 0.5 MG Tab Take 0.5 mg by mouth at bedtime as needed for Sleep.     • metFORMIN (GLUCOPHAGE) 500 MG Tab TAKE ONE TABLET BY MOUTH TWICE A DAY WITH A MEAL 180 Tab 0   • modafinil (PROVIGIL) 100 MG Tab Take 100 mg by mouth every day. Take every morning 200 mg     • lamoTRIgine (LAMICTAL) 100 MG Tab Take 100 mg by mouth 2 times a day. 100mg am and 200mg pm     • Vilazodone HCl (VIIBRYD) 40 MG Tab Take 40 mg by mouth  every day.     • topiramate (TOPAMAX) 100 MG Tab Take 100 mg by mouth every day. 3 tabs qhs     • liothyronine (CYTOMEL) 5 MCG Tab Take 15 mcg by mouth every day. 3 tabs once day      • VITAMIN D PO Take 2,000 mg by mouth.       No current facility-administered medications for this visit.       Social History     Tobacco Use   • Smoking status: Former Smoker     Quit date: 2014     Years since quittin.8   • Smokeless tobacco: Never Used   Substance Use Topics   • Alcohol use: Not Currently     Comment: rare   • Drug use: Never     Social History     Social History Narrative   • Not on file       Family History   Problem Relation Age of Onset   • No Known Problems Mother         ROS:     - Constitutional:  Negative for fever, chills, unexpected weight change, and fatigue/generalized weakness.    - HEENT:  Negative for headaches, vision changes, hearing changes, ear pain, ear discharge, rhinorrhea, sinus congestion, sore throat, and neck pain.      - Respiratory: Negative for cough, sputum production, chest congestion, dyspnea, wheezing, and crackles.      - Cardiovascular: Negative for chest pain, palpitations, orthopnea, and bilateral lower extremity edema.     - Gastrointestinal: Negative for heartburn, nausea, vomiting, abdominal pain, hematochezia, melena, diarrhea, constipation, and greasy/foul-smelling stools.     - Genitourinary: Negative for dysuria, polyuria, hematuria, pyuria, urinary urgency, and urinary incontinence.     - Musculoskeletal: Positive for back pain.  Negative for myalgias,  and joint pain.     - Skin: Negative for rash, itching, cyanotic skin color change.     - Neurological: Negative for dizziness, tingling, tremors, focal sensory deficit, focal weakness and headaches.     - Endo/Heme/Allergies: Does not bruise/bleed easily.     - Psychiatric/Behavioral: Positive for acute stress and anxiety.  Negative for depression, suicidal/homicidal ideation and memory loss.          - NOTE: All  "other systems reviewed and are negative, except as in HPI.      Exam:    /84 (BP Location: Right arm, Patient Position: Sitting, BP Cuff Size: Adult)   Pulse 99   Temp (!) 35.6 °C (96 °F) (Temporal)   Resp 16   Ht 1.753 m (5' 9\")   Wt (!) 136 kg (299 lb 3.2 oz)   SpO2 95%  Body mass index is 44.18 kg/m².    General:  Well nourished, well developed male in NAD  Head is grossly normal.  Neck: Supple. Thyroid is not enlarged.  Pulmonary: Clear to auscultation. No ronchi, wheezing or rales  Cardiac: Regular rate and rhythm. No murmurs.  Skin: Warm and dry. No obvious lesions  Neuro: Normal muscle tone. Gait normal. Alert and oriented.  Monofilament exam normal  Psych: Normal mood and affect      Please note that this dictation was created using voice recognition software. I have made every reasonable attempt to correct obvious errors, but I expect that there are errors of grammar and possibly content that I did not discover before finalizing the note.    LABS: 4/19/2021: Results reviewed and discussed with the patient, questions answered.    Assessment/Plan:  1. Physical exam, annual  Annual labs printed  - CBC WITH DIFFERENTIAL; Future  - Comp Metabolic Panel; Future  - Lipid Profile; Future    2. Type 2 diabetes mellitus without complication, without long-term current use of insulin (HCC)  His diabetes is borderline high but still controlled at this point.  We will check this again in 6 months  - MICROALBUMIN CREAT RATIO URINE; Future  - POCT A1C  - Diabetic Monofilament Lower Extremity Exam    3. Essential hypertension  Blood pressure is very well controlled    4. Acute right-sided thoracic back pain  Continue to follow-up with physical therapy and orthopedics.          "

## 2021-04-27 ENCOUNTER — PATIENT MESSAGE (OUTPATIENT)
Dept: MEDICAL GROUP | Facility: PHYSICIAN GROUP | Age: 36
End: 2021-04-27

## 2021-04-27 DIAGNOSIS — I10 HYPERTENSION, UNSPECIFIED TYPE: ICD-10-CM

## 2021-04-27 DIAGNOSIS — E11.9 TYPE 2 DIABETES MELLITUS WITHOUT COMPLICATION, WITHOUT LONG-TERM CURRENT USE OF INSULIN (HCC): ICD-10-CM

## 2021-04-27 NOTE — PATIENT COMMUNICATION
Received request via: Patient    Was the patient seen in the last year in this department? Yes    Does the patient have an active prescription (recently filled or refills available) for medication(s) requested? No     Requested Prescriptions     Pending Prescriptions Disp Refills   • metFORMIN (GLUCOPHAGE) 500 MG Tab       Sig: Take  by mouth.   • lisinopril (PRINIVIL) 10 MG Tab 60 tablet 0     Sig: Take 1 tablet by mouth every day for 60 days.

## 2021-04-28 RX ORDER — LISINOPRIL 10 MG/1
10 TABLET ORAL DAILY
Qty: 60 TABLET | Refills: 0 | Status: SHIPPED | OUTPATIENT
Start: 2021-04-28 | End: 2021-06-27

## 2021-05-06 ENCOUNTER — APPOINTMENT (OUTPATIENT)
Dept: URGENT CARE | Facility: CLINIC | Age: 36
End: 2021-05-06
Payer: COMMERCIAL

## 2021-05-07 ENCOUNTER — OFFICE VISIT (OUTPATIENT)
Dept: MEDICAL GROUP | Facility: PHYSICIAN GROUP | Age: 36
End: 2021-05-07
Payer: COMMERCIAL

## 2021-05-07 VITALS
SYSTOLIC BLOOD PRESSURE: 128 MMHG | RESPIRATION RATE: 16 BRPM | DIASTOLIC BLOOD PRESSURE: 70 MMHG | BODY MASS INDEX: 44.2 KG/M2 | HEIGHT: 69 IN | WEIGHT: 298.4 LBS | TEMPERATURE: 97.7 F | OXYGEN SATURATION: 95 %

## 2021-05-07 DIAGNOSIS — K92.0 HEMATEMESIS WITH NAUSEA: ICD-10-CM

## 2021-05-07 PROCEDURE — 99213 OFFICE O/P EST LOW 20 MIN: CPT | Performed by: PHYSICIAN ASSISTANT

## 2021-05-07 NOTE — PROGRESS NOTES
CC:  Chief Complaint   Patient presents with   • Follow-Up     throwing up blood, in the morning,        HISTORY OF PRESENT ILLNESS: Patient is a 35 y.o. male established patient presenting with hematemasis that started yesterday. Had some nausea and abd pain that started yesterday. Was taking advil often for his back pain and now is on meloxicam. Feels improved today. Does not drink EtOH. Went to urgent care yesterday and was told they could not see him there for this.       No problem-specific Assessment & Plan notes found for this encounter.      Patient Active Problem List    Diagnosis Date Noted   • Acute right-sided thoracic back pain 04/19/2021   • RIK (obstructive sleep apnea) 07/07/2020   • Anxiety 07/07/2020   • Chronic fatigue 07/07/2020   • Elevated ALT measurement 07/07/2020   • Vitamin D deficiency 07/07/2020   • Type 2 diabetes mellitus without complication, without long-term current use of insulin ([facility]) 06/08/2020   • Dyslipidemia 06/08/2020   • Class 3 severe obesity due to excess calories without serious comorbidity with body mass index (BMI) of 40.0 to 44.9 in adult ([facility]) 06/08/2020   • Fatty liver 06/08/2020      Allergies:Patient has no known allergies.    Current Outpatient Medications   Medication Sig Dispense Refill   • metFORMIN (GLUCOPHAGE) 500 MG Tab Take 1 tablet by mouth 2 times a day with meals. 180 tablet 0   • lisinopril (PRINIVIL) 10 MG Tab Take 1 tablet by mouth every day for 60 days. 60 tablet 0   • atorvastatin (LIPITOR) 10 MG Tab TAKE ONE TABLET BY MOUTH DAILY 90 tablet 0   • ALPRAZolam (XANAX) 0.5 MG Tab Take 0.5 mg by mouth at bedtime as needed for Sleep.     • lamoTRIgine (LAMICTAL) 100 MG Tab Take 100 mg by mouth 2 times a day. 100mg am and 200mg pm     • Vilazodone HCl (VIIBRYD) 40 MG Tab Take 40 mg by mouth every day.     • meloxicam (MOBIC) 7.5 MG Tab Take 7.5 mg by mouth every day.     • cyclobenzaprine (FLEXERIL) 10 mg Tab Take 10 mg by mouth as needed.     • modafinil  "(PROVIGIL) 100 MG Tab Take 100 mg by mouth every day. Take every morning 200 mg       No current facility-administered medications for this visit.       Social History     Tobacco Use   • Smoking status: Former Smoker     Quit date: 2014     Years since quittin.9   • Smokeless tobacco: Never Used   Substance Use Topics   • Alcohol use: Not Currently     Comment: rare   • Drug use: Never     Social History     Social History Narrative   • Not on file       Family History   Problem Relation Age of Onset   • No Known Problems Mother         ROS:     - Constitutional:  Negative for fever, chills, unexpected weight change, and fatigue/generalized weakness.    - HEENT:  Negative for headaches, vision changes, hearing changes, ear pain, ear discharge, rhinorrhea, sinus congestion, sore throat, and neck pain.      - Respiratory: Negative for cough, sputum production, chest congestion, dyspnea, wheezing, and crackles.      - Cardiovascular: Negative for chest pain, palpitations, orthopnea, and bilateral lower extremity edema.     - Gastrointestinal: Positive for hemoptysis and abdominal pain.  Negative for heartburn,  hematochezia, melena, diarrhea, constipation, and greasy/foul-smelling stools.      - Psychiatric/Behavioral: Positive for anxiety and stress.  Negative for depression, suicidal/homicidal ideation and memory loss.          - NOTE: All other systems reviewed and are negative, except as in HPI.      Exam:    /70   Temp 36.5 °C (97.7 °F) (Temporal)   Resp 16   Ht 1.753 m (5' 9\")   Wt (!) 135 kg (298 lb 6.4 oz)   SpO2 95%  Body mass index is 44.07 kg/m².    General:  Well nourished, well developed male in NAD  Head is grossly normal.  Neck: Supple. Thyroid is not enlarged.  Pulmonary: No accessory muscle use.  Abdomen: Soft, nontender.  Skin: Warm and dry. No obvious lesions  Neuro: Normal muscle tone. Gait normal. Alert and oriented.  Psych: Normal mood and affect      Please note that this " dictation was created using voice recognition software. I have made every reasonable attempt to correct obvious errors, but I expect that there are errors of grammar and possibly content that I did not discover before finalizing the note.        Assessment/Plan:  1. Hematemesis with nausea  Likely stemming from heavy NSAID use. This appears to be resolving as he is feeling better today. He should stop taking NSAIDs like meloxicam for now. Start taking OTC prilosec and eat light foods. If not resolving or worsening, return to office and consider GI referral. If he is to continue with NSAID use in the future, should consider taking pepcid daily

## 2021-06-01 DIAGNOSIS — G47.33 OSA (OBSTRUCTIVE SLEEP APNEA): ICD-10-CM

## 2021-06-01 NOTE — PROGRESS NOTES
Patient has sleep apnea and currently sees [facility] and wants to switch to [facility]

## 2021-07-13 ENCOUNTER — PATIENT MESSAGE (OUTPATIENT)
Dept: MEDICAL GROUP | Facility: PHYSICIAN GROUP | Age: 36
End: 2021-07-13

## 2021-07-13 RX ORDER — VILAZODONE HYDROCHLORIDE 40 MG/1
40 TABLET ORAL DAILY
Qty: 30 TABLET | Refills: 0 | Status: SHIPPED | OUTPATIENT
Start: 2021-07-13 | End: 2021-08-31 | Stop reason: SDUPTHER

## 2021-07-13 RX ORDER — LAMOTRIGINE 100 MG/1
100 TABLET ORAL EVERY MORNING
Qty: 30 TABLET | Refills: 0 | Status: SHIPPED | OUTPATIENT
Start: 2021-07-13 | End: 2021-12-21

## 2021-07-13 RX ORDER — LAMOTRIGINE 100 MG/1
200 TABLET ORAL
Qty: 60 TABLET | Refills: 0 | Status: SHIPPED | OUTPATIENT
Start: 2021-07-13 | End: 2021-08-31 | Stop reason: SDUPTHER

## 2021-07-13 RX ORDER — LAMOTRIGINE 100 MG/1
100 TABLET ORAL DAILY
COMMUNITY
End: 2021-07-13 | Stop reason: SDUPTHER

## 2021-07-13 NOTE — PATIENT COMMUNICATION
Received request via: Patient    Was the patient seen in the last year in this department? Yes    Does the patient have an active prescription (recently filled or refills available) for medication(s) requested? No   Requested Prescriptions     Pending Prescriptions Disp Refills   • Vilazodone HCl (VIIBRYD) 40 MG Tab 30 tablet 0     Sig: Take 40 mg by mouth every day.   • lamoTRIgine (LAMICTAL) 100 MG Tab 60 tablet 0     Sig: Take 2 Tablets by mouth at bedtime. 200mg pm   • lamoTRIgine (LAMICTAL) 100 MG Tab 30 tablet 0     Sig: Take 1 tablet by mouth every morning.

## 2021-08-16 ENCOUNTER — OFFICE VISIT (OUTPATIENT)
Dept: URGENT CARE | Facility: CLINIC | Age: 36
End: 2021-08-16
Payer: COMMERCIAL

## 2021-08-16 VITALS
OXYGEN SATURATION: 98 % | WEIGHT: 280.6 LBS | TEMPERATURE: 97.6 F | BODY MASS INDEX: 41.56 KG/M2 | SYSTOLIC BLOOD PRESSURE: 126 MMHG | RESPIRATION RATE: 20 BRPM | HEIGHT: 69 IN | DIASTOLIC BLOOD PRESSURE: 88 MMHG | HEART RATE: 98 BPM

## 2021-08-16 DIAGNOSIS — T14.8XXA WOUND OF SKIN: ICD-10-CM

## 2021-08-16 PROCEDURE — 99213 OFFICE O/P EST LOW 20 MIN: CPT | Performed by: FAMILY MEDICINE

## 2021-08-16 RX ORDER — CEPHALEXIN 500 MG/1
500 CAPSULE ORAL 3 TIMES DAILY
Qty: 9 CAPSULE | Refills: 0 | Status: SHIPPED | OUTPATIENT
Start: 2021-08-16 | End: 2021-08-19

## 2021-08-16 ASSESSMENT — ENCOUNTER SYMPTOMS: ROS SKIN COMMENTS: CUT FINGER

## 2021-08-16 NOTE — PROGRESS NOTES
"Subjective     Leroy Naylor is a 36 y.o. male who presents with Laceration ((L) index finger bush harrison x 1 hr ago )      - This is a pleasant and nontoxic appearing 36 y.o. male with c/o cut Lt index finger about 45 min ago. Doing yard work and a  clipped Lt index finger. Last tetanus within past 5 years       ALLERGIES:  Patient has no known allergies.     PMH:  Past Medical History:   Diagnosis Date   • Bipolar 2 disorder (HCC)    • Diabetes (HCC)    • Sleep apnea    • Thyroid disease         PSH:  History reviewed. No pertinent surgical history.    MEDS:    Current Outpatient Medications:   •  LAMOTRIGINE PO, , Disp: , Rfl:   •  mupirocin (BACTROBAN) 2 % Ointment, Apply 1 Application topically 2 times a day for 7 days., Disp: 30 g, Rfl: 1  •  cephALEXin (KEFLEX) 500 MG Cap, Take 1 Capsule by mouth 3 times a day for 3 days., Disp: 9 Capsule, Rfl: 0  •  Vilazodone HCl (VIIBRYD) 40 MG Tab, Take 40 mg by mouth every day., Disp: 30 tablet, Rfl: 0  •  lamoTRIgine (LAMICTAL) 100 MG Tab, Take 2 Tablets by mouth at bedtime. 200mg pm, Disp: 60 tablet, Rfl: 0  •  lamoTRIgine (LAMICTAL) 100 MG Tab, Take 1 tablet by mouth every morning., Disp: 30 tablet, Rfl: 0  •  metFORMIN (GLUCOPHAGE) 500 MG Tab, Take 1 tablet by mouth 2 times a day with meals., Disp: 180 tablet, Rfl: 0  •  atorvastatin (LIPITOR) 10 MG Tab, TAKE ONE TABLET BY MOUTH DAILY, Disp: 90 tablet, Rfl: 0    ** I have documented what I find to be significant in regards to past medical, social, family and surgical history  in my HPI or under PMH/PSH/FH review section, otherwise it is noncontributory **           HPI    Review of Systems   Skin:        Cut finger   All other systems reviewed and are negative.             Objective     /88 (BP Location: Right arm, Patient Position: Sitting)   Pulse 98   Temp 36.4 °C (97.6 °F) (Temporal)   Resp 20   Ht 1.753 m (5' 9\")   Wt (!) 127 kg (280 lb 9.6 oz)   SpO2 98%   BMI 41.44 kg/m²  "     Physical Exam  Vitals and nursing note reviewed.   Constitutional:       General: He is not in acute distress.     Appearance: Normal appearance. He is well-developed.   HENT:      Head: Normocephalic and atraumatic.   Eyes:      General: No scleral icterus.  Cardiovascular:      Heart sounds: Normal heart sounds. No murmur heard.     Pulmonary:      Effort: Pulmonary effort is normal. No respiratory distress.   Musculoskeletal:        Hands:       Comments: ~1.5cm well approximated lac Lt index finger. Good SROM, NVI   Skin:     Coloration: Skin is not jaundiced or pale.   Neurological:      Mental Status: He is alert.      Motor: No abnormal muscle tone.   Psychiatric:         Mood and Affect: Mood normal.         Behavior: Behavior normal.              Assessment & Plan          1. Wound of skin  mupirocin (BACTROBAN) 2 % Ointment    cephALEXin (KEFLEX) 500 MG Cap       - Dx, plan & d/c instructions discussed w/ patient   - wound irrigated and cleaned w/ NS, dressing applied, wound care discussed   - Rest, stay hydrated OTC Motrin and/or Tylenol as needed  - E.R. precautions discussed     Asked to kindly follow up with their PCP's office in 2-3 days for a recheck, ER if not improving or feeling/getting worse.    Any realistic side effects of medications that may have been given today reviewed.     Patient left in stable condition      reviewed if narcotics given

## 2021-10-11 ENCOUNTER — TELEPHONE (OUTPATIENT)
Dept: SCHEDULING | Facility: IMAGING CENTER | Age: 36
End: 2021-10-11

## 2021-12-21 ENCOUNTER — OFFICE VISIT (OUTPATIENT)
Dept: MEDICAL GROUP | Facility: PHYSICIAN GROUP | Age: 36
End: 2021-12-21
Payer: COMMERCIAL

## 2021-12-21 VITALS
SYSTOLIC BLOOD PRESSURE: 130 MMHG | TEMPERATURE: 97.2 F | WEIGHT: 280.8 LBS | HEIGHT: 69 IN | DIASTOLIC BLOOD PRESSURE: 86 MMHG | OXYGEN SATURATION: 96 % | RESPIRATION RATE: 20 BRPM | BODY MASS INDEX: 41.59 KG/M2 | HEART RATE: 98 BPM

## 2021-12-21 DIAGNOSIS — E78.5 DYSLIPIDEMIA: ICD-10-CM

## 2021-12-21 DIAGNOSIS — F32.A DEPRESSIVE DISORDER: ICD-10-CM

## 2021-12-21 DIAGNOSIS — Z00.00 ANNUAL PHYSICAL EXAM: ICD-10-CM

## 2021-12-21 DIAGNOSIS — E11.9 TYPE 2 DIABETES MELLITUS WITHOUT COMPLICATION, WITHOUT LONG-TERM CURRENT USE OF INSULIN (HCC): ICD-10-CM

## 2021-12-21 PROCEDURE — 99214 OFFICE O/P EST MOD 30 MIN: CPT | Performed by: NURSE PRACTITIONER

## 2021-12-21 RX ORDER — VILAZODONE HYDROCHLORIDE 40 MG/1
40 TABLET ORAL DAILY
Qty: 90 TABLET | Refills: 3 | Status: SHIPPED | OUTPATIENT
Start: 2021-12-21 | End: 2022-04-29

## 2021-12-21 RX ORDER — ATORVASTATIN CALCIUM 10 MG/1
10 TABLET, FILM COATED ORAL DAILY
Qty: 90 TABLET | Refills: 3 | Status: SHIPPED | OUTPATIENT
Start: 2021-12-21 | End: 2022-02-04 | Stop reason: SDUPTHER

## 2021-12-21 NOTE — ASSESSMENT & PLAN NOTE
Chronic problem.  Patient was previously evaluated by psychiatry.  Previously diagnosed with bipolar 2 by psychiatrist.  Therapy referred to depressive disorder.  Patient is currently on Viibryd 40 mg and states symptoms are well controlled.  He is also seeing therapy which helps.  No SI, HI.  Patient is not seeing psychiatry at this time as his symptoms are well controlled.  He is interested in refills today.

## 2021-12-21 NOTE — ASSESSMENT & PLAN NOTE
Results for DARWIN ELISE BITA (MRN [id number]) as of 12/21/2021 14:05   Ref. Range 10/1/2020 11:23   Cholesterol,Tot Latest Ref Range: 100 - 199 mg/dL 157   Triglycerides Latest Ref Range: 0 - 149 mg/dL 95   HDL Latest Ref Range: >=40 mg/dL 41   LDL Latest Ref Range: <100 mg/dL 97   Well-controlled on atorvastatin 10 mg nightly, tolerating well

## 2021-12-21 NOTE — PROGRESS NOTES
Chief Complaint   Patient presents with   • Establish Care       HISTORY OF PRESENT ILLNESS: Patient is a 36 y.o. male, established patient who presents today to discuss medical problems as listed below:    Health Maintenance:  COMPLETED     Type 2 diabetes mellitus without complication, without long-term current use of insulin (HCC)  New to me.  Chronic problem.  Most recent A1c is 6.9 on April 2021.  On Metformin 500 mg twice daily, forgetting to take this sometimes.  Patient admits to not feeling very well, polyuria, polydipsia and polyphagia.  She does not check daily BG's.  Admits to neuropathy intermittently in bilateral feet.  Never had retina check.    Dyslipidemia  Results for ELISE GRANADOS (MRN [id number]) as of 12/21/2021 14:05   Ref. Range 10/1/2020 11:23   Cholesterol,Tot Latest Ref Range: 100 - 199 mg/dL 157   Triglycerides Latest Ref Range: 0 - 149 mg/dL 95   HDL Latest Ref Range: >=40 mg/dL 41   LDL Latest Ref Range: <100 mg/dL 97   Well-controlled on atorvastatin 10 mg nightly, tolerating well    Depressive disorder  Chronic problem.  Patient was previously evaluated by psychiatry.  Previously diagnosed with bipolar 2 by psychiatrist.  Therapy referred to depressive disorder.  Patient is currently on Viibryd 40 mg and states symptoms are well controlled.  He is also seeing therapy which helps.  No SI, HI.  Patient is not seeing psychiatry at this time as his symptoms are well controlled.  He is interested in refills today.      Patient Active Problem List    Diagnosis Date Noted   • Body mass index (BMI)40.0-44.9, adult 08/16/2021   • Acute right-sided thoracic back pain 04/19/2021   • RIK (obstructive sleep apnea) 07/07/2020   • Depressive disorder 07/07/2020   • Chronic fatigue 07/07/2020   • Elevated ALT measurement 07/07/2020   • Vitamin D deficiency 07/07/2020   • Type 2 diabetes mellitus without complication, without long-term current use of insulin (HCC) 06/08/2020   • Dyslipidemia 06/08/2020    • Class 3 severe obesity due to excess calories without serious comorbidity with body mass index (BMI) of 40.0 to 44.9 in adult ([facility]) 2020   • Fatty liver 2020        Allergies: Patient has no known allergies.    Current Outpatient Medications   Medication Sig Dispense Refill   • atorvastatin (LIPITOR) 10 MG Tab Take 1 Tablet by mouth every day. 90 Tablet 3   • Vilazodone HCl (VIIBRYD) 40 MG Tab Take 40 mg by mouth every day for 180 days. 90 Tablet 3   • metFORMIN (GLUCOPHAGE) 500 MG Tab Take 1 Tablet by mouth 2 times a day with meals. 180 Tablet 0   • lamoTRIgine (LAMICTAL) 100 MG Tab Take 1 tablet by mouth in the morning and 2 tablets in the evening 270 Tablet 1     No current facility-administered medications for this visit.       Social History     Tobacco Use   • Smoking status: Former Smoker     Quit date: 2014     Years since quittin.5   • Smokeless tobacco: Never Used   Vaping Use   • Vaping Use: Never used   Substance Use Topics   • Alcohol use: Not Currently   • Drug use: Never     Social History     Social History Narrative   • Not on file       Family History   Problem Relation Age of Onset   • No Known Problems Mother        Allergies, past medical history, past surgical history, family history, social history reviewed and updated.    Review of Systems:     - Constitutional: Negative for fever, chills, unexpected weight change, and fatigue/generalized weakness.     - HEENT: Negative for headaches, vision changes, hearing changes, ear pain, ear discharge, rhinorrhea, sinus congestion, sore throat, and neck pain.      - Respiratory: Negative for cough, sputum production, chest congestion, dyspnea, wheezing, and crackles.      - Cardiovascular: Negative for chest pain, palpitations, orthopnea, and bilateral lower extremity edema.     - Gastrointestinal: Negative for heartburn, nausea, vomiting, abdominal pain, hematochezia, melena, diarrhea, constipation, and greasy/foul-smelling  "stools.     - Genitourinary: Negative for dysuria, polyuria, hematuria, pyuria, urinary urgency, and urinary incontinence.    - Musculoskeletal: Negative for myalgias, back pain, and joint pain.     - Skin: Negative for rash, itching, cyanotic skin color change.     - Neurological: Negative for dizziness, tingling, tremors, focal sensory deficit, focal weakness and headaches.     - Endo/Heme/Allergies: Does not bruise/bleed easily.     - Psychiatric/Behavioral: Negative for depression, suicidal/homicidal ideation and memory loss.      All other systems reviewed and are negative    Exam:    /86   Pulse 98   Temp 36.2 °C (97.2 °F) (Temporal)   Resp 20   Ht 1.753 m (5' 9\")   Wt (!) 127 kg (280 lb 12.8 oz)   SpO2 96%   BMI 41.47 kg/m²  Body mass index is 41.47 kg/m².    Physical Exam:  Constitutional: Well-developed and well-nourished. Not diaphoretic. No distress.   Skin: Skin is warm and dry. No rash noted.  Head: Atraumatic without lesions.  Eyes: Conjunctivae and extraocular motions are normal. Pupils are equal, round, and reactive to light. No scleral icterus.   Ears:  External ears unremarkable. Tympanic membranes clear and intact.  Nose: Nares patent. Septum midline. Turbinates without erythema nor edema. No discharge.   Mouth/Throat: Dentition is normal. Tongue normal. Oropharynx is clear and moist. Posterior pharynx without erythema or exudates.  Neck: Supple, trachea midline. Normal range of motion. No thyromegaly present. No lymphadenopathy--cervical or supraclavicular.  Cardiovascular: Regular rate and rhythm, S1 and S2 without murmur, rubs, or gallops.    Chest: Effort normal. Clear to auscultation throughout. No adventitious sounds. No CVA tenderness.  Abdomen: Soft, non tender, and without distention. Active bowel sounds in all four quadrants. No rebound, guarding, masses or HSM.  : Negative for dysuria, polyuria, hematuria, pyuria, urinary urgency, and urinary incontinence.  Extremities: " No cyanosis, clubbing, erythema, nor edema. Distal pulses intact and symmetric.   Musculoskeletal: All major joints AROM full in all directions without pain.  Neurological: Alert and oriented x 3. DTRs 2+/3 and symmetric. No cranial nerve deficit. 5/5 myotomes. Sensation intact. Negative Rhomberg.  Psychiatric:  Behavior, mood, and affect are appropriate.  MA/nursing note and vitals reviewed.    LABS: 4/2021  results reviewed and discussed with the patient, questions answered.    Assessment/Plan:  1. Type 2 diabetes mellitus without complication, without long-term current use of insulin (HCC)  Uncontrolled, stable.  Continue Metformin.  We will recheck labs and discuss findings with patient following appointment.  - Referral to Ophthalmology  - Referral for Retinal Screening Exam; Future  - Hemoglobin A1C; Future    2. Annual physical exam  - CBC WITHOUT DIFFERENTIAL; Future  - Comp Metabolic Panel; Future  - Lipid Profile; Future  - TSH; Future  - VITAMIN D,25 HYDROXY; Future  - FREE THYROXINE; Future    3. Dyslipidemia  Stable on current regimen.  Continue.  Refills given.  - atorvastatin (LIPITOR) 10 MG Tab; Take 1 Tablet by mouth every day.  Dispense: 90 Tablet; Refill: 3    4.  Depressive disorder  Stable on current regimen.  Continue.  Refills given.  Patient is seeing a therapist.       Discussed with patient possible alternative diagnoses, pt is to take all medications as prescribed. If symptoms persist FU w/PCP, if symptoms worsen go to emergency room. If experiencing any side effects from prescribed medications reports to the office immediately or go to emergency room.  Reviewed indication, dosage, usage and potential adverse effects of prescribed medications. Reviewed risks and benefits of treatment plan. Patient verbalizes understanding of all instruction and verbally agrees to plan.    No follow-ups on file. 2 wks

## 2021-12-21 NOTE — ASSESSMENT & PLAN NOTE
New to me.  Chronic problem.  Most recent A1c is 6.9 on April 2021.  On Metformin 500 mg twice daily, forgetting to take this sometimes.  Patient admits to not feeling very well, polyuria, polydipsia and polyphagia.  She does not check daily BG's.  Admits to neuropathy intermittently in bilateral feet.  Never had retina check.

## 2022-01-16 DIAGNOSIS — E11.9 TYPE 2 DIABETES MELLITUS WITHOUT COMPLICATION, WITHOUT LONG-TERM CURRENT USE OF INSULIN (HCC): ICD-10-CM

## 2022-01-17 NOTE — TELEPHONE ENCOUNTER
Received request via: Pharmacy    Was the patient seen in the last year in this department? Yes    Does the patient have an active prescription (recently filled or refills available) for medication(s) requested? No     Next appt 1/28/2022

## 2022-02-04 ENCOUNTER — OFFICE VISIT (OUTPATIENT)
Dept: MEDICAL GROUP | Facility: PHYSICIAN GROUP | Age: 37
End: 2022-02-04
Payer: COMMERCIAL

## 2022-02-04 ENCOUNTER — TELEPHONE (OUTPATIENT)
Dept: VASCULAR LAB | Facility: MEDICAL CENTER | Age: 37
End: 2022-02-04

## 2022-02-04 VITALS
HEART RATE: 88 BPM | TEMPERATURE: 97.1 F | HEIGHT: 69 IN | DIASTOLIC BLOOD PRESSURE: 84 MMHG | RESPIRATION RATE: 20 BRPM | OXYGEN SATURATION: 95 % | BODY MASS INDEX: 41.03 KG/M2 | SYSTOLIC BLOOD PRESSURE: 124 MMHG | WEIGHT: 277 LBS

## 2022-02-04 DIAGNOSIS — R74.01 ELEVATED ALT MEASUREMENT: ICD-10-CM

## 2022-02-04 DIAGNOSIS — E55.9 VITAMIN D DEFICIENCY: ICD-10-CM

## 2022-02-04 DIAGNOSIS — E78.5 DYSLIPIDEMIA: ICD-10-CM

## 2022-02-04 DIAGNOSIS — E11.9 TYPE 2 DIABETES MELLITUS WITHOUT COMPLICATION, WITHOUT LONG-TERM CURRENT USE OF INSULIN (HCC): ICD-10-CM

## 2022-02-04 PROCEDURE — 99214 OFFICE O/P EST MOD 30 MIN: CPT | Performed by: NURSE PRACTITIONER

## 2022-02-04 RX ORDER — FLASH GLUCOSE SENSOR
1 KIT MISCELLANEOUS
Qty: 4 EACH | Refills: 3 | Status: SHIPPED | OUTPATIENT
Start: 2022-02-04 | End: 2022-03-25

## 2022-02-04 RX ORDER — ATORVASTATIN CALCIUM 10 MG/1
10 TABLET, FILM COATED ORAL DAILY
Qty: 90 TABLET | Refills: 3 | Status: SHIPPED | OUTPATIENT
Start: 2022-02-04 | End: 2022-04-04 | Stop reason: SDUPTHER

## 2022-02-04 RX ORDER — LANCETS 30 GAUGE
EACH MISCELLANEOUS
Qty: 100 EACH | Refills: 0 | Status: SHIPPED | OUTPATIENT
Start: 2022-02-04 | End: 2022-04-29

## 2022-02-04 RX ORDER — FLASH GLUCOSE SCANNING READER
1 EACH MISCELLANEOUS
Qty: 4 EACH | Refills: 3 | Status: SHIPPED | OUTPATIENT
Start: 2022-02-04 | End: 2022-03-25

## 2022-02-04 ASSESSMENT — PATIENT HEALTH QUESTIONNAIRE - PHQ9
9. THOUGHTS THAT YOU WOULD BE BETTER OFF DEAD, OR OF HURTING YOURSELF: NOT AT ALL
1. LITTLE INTEREST OR PLEASURE IN DOING THINGS: NOT AT ALL
SUM OF ALL RESPONSES TO PHQ9 QUESTIONS 1 AND 2: 0
5. POOR APPETITE OR OVEREATING: NOT AT ALL
6. FEELING BAD ABOUT YOURSELF - OR THAT YOU ARE A FAILURE OR HAVE LET YOURSELF OR YOUR FAMILY DOWN: NOT AL ALL
2. FEELING DOWN, DEPRESSED, IRRITABLE, OR HOPELESS: NOT AT ALL
SUM OF ALL RESPONSES TO PHQ QUESTIONS 1-9: 0
4. FEELING TIRED OR HAVING LITTLE ENERGY: NOT AT ALL
3. TROUBLE FALLING OR STAYING ASLEEP OR SLEEPING TOO MUCH: NOT AT ALL
8. MOVING OR SPEAKING SO SLOWLY THAT OTHER PEOPLE COULD HAVE NOTICED. OR THE OPPOSITE, BEING SO FIGETY OR RESTLESS THAT YOU HAVE BEEN MOVING AROUND A LOT MORE THAN USUAL: NOT AT ALL
7. TROUBLE CONCENTRATING ON THINGS, SUCH AS READING THE NEWSPAPER OR WATCHING TELEVISION: NOT AT ALL

## 2022-02-04 NOTE — PROGRESS NOTES
Chief Complaint   Patient presents with   • Lab Results       HISTORY OF PRESENT ILLNESS: Patient is a 36 y.o. male, established patient who presents today to discuss medical problems as listed below:    Health Maintenance:  COMPLETED     Type 2 diabetes mellitus without complication, without long-term current use of insulin ([facility])  Labs from 12/23/2021 with A1c of 8.4  On Metformin 500 mg BID, takes on and off.  Admits to nutrition needing to be improved. Pt is changing his nutrition.     Vitamin D deficiency  Labs from 12/23/2021 with vitamin D at 17.    Dyslipidemia  Labs from 12/24/2021 total cholesterol 176, triglycerides 126,  and HDL at 32.  On atorvastatin 10 mg, was taking on and off.  Quit smoking 6 years ago.    Elevated ALT measurement  Labs from 12/24/2021 with AST at 43 and ALT at 82.  Patient states this has been consistent, per patient previous ultrasound with fatty liver.  Denies abdominal distention, nausea or RUQ pain.  He does not drink alcohol.  Does admit to taking NSAID OTC 3-4 times per week for generalized pain or headaches.      Patient Active Problem List    Diagnosis Date Noted   • Body mass index (BMI)40.0-44.9, adult 08/16/2021   • Acute right-sided thoracic back pain 04/19/2021   • RIK (obstructive sleep apnea) 07/07/2020   • Depressive disorder 07/07/2020   • Chronic fatigue 07/07/2020   • Elevated ALT measurement 07/07/2020   • Vitamin D deficiency 07/07/2020   • Type 2 diabetes mellitus without complication, without long-term current use of insulin ([facility]) 06/08/2020   • Dyslipidemia 06/08/2020   • Class 3 severe obesity due to excess calories without serious comorbidity with body mass index (BMI) of 40.0 to 44.9 in adult ([facility]) 06/08/2020   • Fatty liver 06/08/2020        Allergies: Patient has no known allergies.    Current Outpatient Medications   Medication Sig Dispense Refill   • atorvastatin (LIPITOR) 10 MG Tab Take 1 Tablet by mouth every day. 90 Tablet 3   • metFORMIN  (GLUCOPHAGE) 500 MG Tab Take 2 Tablets by mouth 2 times a day with meals. 360 Tablet 0   • Continuous Blood Gluc  (FREESTYLE FALLON 14 DAY READER) Device 1 Application every 14 days. 4 Each 3   • Continuous Blood Gluc Sensor (FREESTYLE FALLON 14 DAY SENSOR) Misc 1 Application every 14 days. 4 Each 3   • Blood Glucose Meter Kit Test blood sugar as recommended by provider. Freestyle Fallon blood glucose monitoring kit. 1 Kit 0   • Blood Glucose Test Strips Use one Freestyle Fallon strip to test blood sugar three times daily. 100 Strip 0   • Lancets Use one Freestyle Fallon lancet to test blood sugar three times daily. 100 Each 0   • Vilazodone HCl (VIIBRYD) 40 MG Tab Take 40 mg by mouth every day for 180 days. 90 Tablet 3   • lamoTRIgine (LAMICTAL) 100 MG Tab Take 1 tablet by mouth in the morning and 2 tablets in the evening 270 Tablet 1     No current facility-administered medications for this visit.       Social History     Tobacco Use   • Smoking status: Former Smoker     Quit date: 2014     Years since quittin.6   • Smokeless tobacco: Never Used   Vaping Use   • Vaping Use: Never used   Substance Use Topics   • Alcohol use: Not Currently   • Drug use: Never     Social History     Social History Narrative   • Not on file       Family History   Problem Relation Age of Onset   • No Known Problems Mother        Allergies, past medical history, past surgical history, family history, social history reviewed and updated.    Review of Systems:     - Constitutional: Negative for fever, chills, unexpected weight change, and fatigue/generalized weakness.     - Respiratory: Negative for cough, sputum production, chest congestion, dyspnea, wheezing, and crackles.      - Cardiovascular: Negative for chest pain, palpitations, orthopnea, and bilateral lower extremity edema.     - Psychiatric/Behavioral: Negative for depression, suicidal/homicidal ideation and memory loss.      All other systems reviewed and are  "negative    Exam:    /84   Pulse 88   Temp 36.2 °C (97.1 °F) (Temporal)   Resp 20   Ht 1.753 m (5' 9\")   Wt (!) 126 kg (277 lb)   SpO2 95%   BMI 40.91 kg/m²  Body mass index is 40.91 kg/m².    Physical Exam:  Constitutional: Well-developed and well-nourished. Not diaphoretic. No distress.   Cardiovascular: Regular rate and rhythm, S1 and S2 without murmur, rubs, or gallops.    Chest: Effort normal. Clear to auscultation throughout. No adventitious sounds.   Neurological: Alert and oriented x 3.   Psychiatric:  Behavior, mood, and affect are appropriate.  MA/nursing note and vitals reviewed.    LABS: 1/2022  results reviewed and discussed with the patient, questions answered.    Assessment/Plan:  1. Type 2 diabetes mellitus without complication, without long-term current use of insulin (HCC)  Uncontrolled, stable.  Discussed etiology.  Patient states he has been working on his nutrition since labs were drawn last month.  He is motivated to work on his diabetes.  Will appreciate the help of pharmacistLeni.  Will increase Metformin to 1000 mg twice daily.  Monitor preprandial BG's daily.  - Referral to Pharmacotherapy Service  - metFORMIN (GLUCOPHAGE) 500 MG Tab; Take 2 Tablets by mouth 2 times a day with meals.  Dispense: 360 Tablet; Refill: 0  - Continuous Blood Gluc  (FREESTYLE FALLON 14 DAY READER) Device; 1 Application every 14 days.  Dispense: 4 Each; Refill: 3  - Continuous Blood Gluc Sensor (FREESTYLE FALLON 14 DAY SENSOR) Misc; 1 Application every 14 days.  Dispense: 4 Each; Refill: 3  - Blood Glucose Meter Kit; Test blood sugar as recommended by provider. Freestyle Fallon blood glucose monitoring kit.  Dispense: 1 Kit; Refill: 0  - Blood Glucose Test Strips; Use one Freestyle Fallon strip to test blood sugar three times daily.  Dispense: 100 Strip; Refill: 0  - Lancets; Use one Freestyle Fallon lancet to test blood sugar three times daily.  Dispense: 100 Each; Refill: 0    2. Vitamin D " deficiency  Discussed Rx versus OTC, patient prefers OTC 5000 IUs daily    3. Dyslipidemia  Uncontrolled, stable.  Will restart atorvastatin  - atorvastatin (LIPITOR) 10 MG Tab; Take 1 Tablet by mouth every day.  Dispense: 90 Tablet; Refill: 3    4. Elevated ALT measurement  We will continue to follow in labs.  His diabetes and cholesterol will improve, will follow the trend.  Avoid NSAIDs.       Discussed with patient possible alternative diagnoses, pt is to take all medications as prescribed. If symptoms persist FU w/PCP, if symptoms worsen go to emergency room. If experiencing any side effects from prescribed medications reports to the office immediately or go to emergency room.  Reviewed indication, dosage, usage and potential adverse effects of prescribed medications. Reviewed risks and benefits of treatment plan. Patient verbalizes understanding of all instruction and verbally agrees to plan.    No follow-ups on file. 3 mos

## 2022-02-04 NOTE — ASSESSMENT & PLAN NOTE
Labs from 12/24/2021 with AST at 43 and ALT at 82.  Patient states this has been consistent, per patient previous ultrasound with fatty liver.  Denies abdominal distention, nausea or RUQ pain.  He does not drink alcohol.  Does admit to taking NSAID OTC 3-4 times per week for generalized pain or headaches.

## 2022-02-04 NOTE — ASSESSMENT & PLAN NOTE
Labs from 12/23/2021 with A1c of 8.4  On Metformin 500 mg BID, takes on and off.  Admits to nutrition needing to be improved. Pt is changing his nutrition.

## 2022-02-04 NOTE — ASSESSMENT & PLAN NOTE
Labs from 12/24/2021 total cholesterol 176, triglycerides 126,  and HDL at 32.  On atorvastatin 10 mg, was taking on and off.  Quit smoking 6 years ago.

## 2022-02-04 NOTE — TELEPHONE ENCOUNTER
Left vm message to establish care to manage DM in the CC office  Leni Aguayo, Clinical Pharmacist, CDE, CACP     MD Notification    Notified Person: MD    Notified Person Name:  Dr. Rolon    Notification Date/Time:  04/20/2021  0050    Notification Interaction:  Face to face      Purpose of Notification:  Critical Lab, Hgb: 6.7    Orders Received:  Transfuse 1 unit PRBC     Comments:  Consent obtained from  for Blood transfusion

## 2022-02-10 ENCOUNTER — NON-PROVIDER VISIT (OUTPATIENT)
Dept: MEDICAL GROUP | Facility: PHYSICIAN GROUP | Age: 37
End: 2022-02-10

## 2022-02-10 VITALS
HEART RATE: 75 BPM | WEIGHT: 280 LBS | SYSTOLIC BLOOD PRESSURE: 117 MMHG | DIASTOLIC BLOOD PRESSURE: 72 MMHG | BODY MASS INDEX: 41.35 KG/M2

## 2022-02-10 DIAGNOSIS — E11.9 TYPE 2 DIABETES MELLITUS WITHOUT COMPLICATION, WITHOUT LONG-TERM CURRENT USE OF INSULIN (HCC): ICD-10-CM

## 2022-02-10 LAB
GLUCOSE BLD-MCNC: 101 MG/DL (ref 70–100)
HBA1C MFR BLD: 6.8 % (ref 0–5.6)
INT CON NEG: ABNORMAL
INT CON POS: ABNORMAL

## 2022-02-10 PROCEDURE — 82962 GLUCOSE BLOOD TEST: CPT | Performed by: STUDENT IN AN ORGANIZED HEALTH CARE EDUCATION/TRAINING PROGRAM

## 2022-02-10 PROCEDURE — 99999 POCT A1C: CPT | Performed by: STUDENT IN AN ORGANIZED HEALTH CARE EDUCATION/TRAINING PROGRAM

## 2022-02-10 PROCEDURE — 83036 HEMOGLOBIN GLYCOSYLATED A1C: CPT | Performed by: STUDENT IN AN ORGANIZED HEALTH CARE EDUCATION/TRAINING PROGRAM

## 2022-02-10 PROCEDURE — 99402 PREV MED CNSL INDIV APPRX 30: CPT | Performed by: STUDENT IN AN ORGANIZED HEALTH CARE EDUCATION/TRAINING PROGRAM

## 2022-02-10 NOTE — NON-PROVIDER
Patient Consult Note    TIME IN: 8:25  TIME OUT: 8:55    Primary care physician: MILLER Jackson    Reason for consult: Management of Controlled Type 2 Diabetes    HPI:  Leroy Naylor is a 36 y.o. old patient who comes in today for evaluation of above stated problem.    Most Recent HbA1c:   Lab Results   Component Value Date/Time    HBA1C 6.9 (A) 04/19/2021 10:20 AM      No results found for: CREATININE           Diabetes Medication History and Current Regimen  Metformin: 1000mg po bid       Pt has home glucometer and proper testing technique - yes    Pt reports blood sugars:     Hypoglycemia awareness - no  Nocturnal hypoglycemia- none  Hypoglycemia:  None    Pt's treatment of Hypoglycemia - n/a  - 15:15 Rule    Current Exercise - none  Exercise Goal - pt would benefit from daily dedicated walking 30-60 minutes daily    Dietary - common adult    Pt reports eating:  mediterrean/dash    Foot Exam:  Monofilament exam - current  Monofilament testing with a 10 gram force: sensation intact: decreased bilaterally.    Visual Inspection: Feet without maceration, ulcers, fissures.  Feet dry.  Pedal pulses: intact bilaterally    Preventative Management  BP regimen (ACE/ARB) - none - will address at next visit  ASA - not indicated  Statin - pt restarted his atorva two weeks ago  Last Retinal Scan - due   Last Foot Exam - current  Last A1c -   Lab Results   Component Value Date/Time    HBA1C 6.9 (A) 04/19/2021 10:20 AM      Last Microalbuminuria - ordered today     updated caregaps    Past Medical History:  Patient Active Problem List    Diagnosis Date Noted   • Body mass index (BMI)40.0-44.9, adult 08/16/2021   • Acute right-sided thoracic back pain 04/19/2021   • RIK (obstructive sleep apnea) 07/07/2020   • Depressive disorder 07/07/2020   • Chronic fatigue 07/07/2020   • Elevated ALT measurement 07/07/2020   • Vitamin D deficiency 07/07/2020   • Type 2 diabetes mellitus without complication, without long-term  current use of insulin ([facility]) 2020   • Dyslipidemia 2020   • Class 3 severe obesity due to excess calories without serious comorbidity with body mass index (BMI) of 40.0 to 44.9 in adult ([facility]) 2020   • Fatty liver 2020       Past Surgical History:  No past surgical history on file.    Allergies:  Patient has no known allergies.    Social History:  Social History     Socioeconomic History   • Marital status: Single     Spouse name: Not on file   • Number of children: Not on file   • Years of education: Not on file   • Highest education level: Not on file   Occupational History   • Occupation: [employer], [employer]   Tobacco Use   • Smoking status: Former Smoker     Quit date: 2014     Years since quittin.6   • Smokeless tobacco: Never Used   Vaping Use   • Vaping Use: Never used   Substance and Sexual Activity   • Alcohol use: Not Currently   • Drug use: Never   • Sexual activity: Not on file   Other Topics Concern   • Not on file   Social History Narrative   • Not on file     Social Determinants of Health     Financial Resource Strain:    • Difficulty of Paying Living Expenses: Not on file   Food Insecurity:    • Worried About Running Out of Food in the Last Year: Not on file   • Ran Out of Food in the Last Year: Not on file   Transportation Needs:    • Lack of Transportation (Medical): Not on file   • Lack of Transportation (Non-Medical): Not on file   Physical Activity:    • Days of Exercise per Week: Not on file   • Minutes of Exercise per Session: Not on file   Stress:    • Feeling of Stress : Not on file   Social Connections:    • Frequency of Communication with Friends and Family: Not on file   • Frequency of Social Gatherings with Friends and Family: Not on file   • Attends Taoism Services: Not on file   • Active Member of Clubs or Organizations: Not on file   • Attends Club or Organization Meetings: Not on file   • Marital Status: Not on file   Intimate Partner  Violence:    • Fear of Current or Ex-Partner: Not on file   • Emotionally Abused: Not on file   • Physically Abused: Not on file   • Sexually Abused: Not on file   Housing Stability:    • Unable to Pay for Housing in the Last Year: Not on file   • Number of Places Lived in the Last Year: Not on file   • Unstable Housing in the Last Year: Not on file       Family History:  Family History   Problem Relation Age of Onset   • No Known Problems Mother        Medications:    Current Outpatient Medications:   •  atorvastatin (LIPITOR) 10 MG Tab, Take 1 Tablet by mouth every day., Disp: 90 Tablet, Rfl: 3  •  metFORMIN (GLUCOPHAGE) 500 MG Tab, Take 2 Tablets by mouth 2 times a day with meals., Disp: 360 Tablet, Rfl: 0  •  Continuous Blood Gluc  (FREESTYLE FALLON 14 DAY READER) Device, 1 Application every 14 days., Disp: 4 Each, Rfl: 3  •  Continuous Blood Gluc Sensor (FREESTYLE FALLON 14 DAY SENSOR) Misc, 1 Application every 14 days., Disp: 4 Each, Rfl: 3  •  Blood Glucose Meter Kit, Test blood sugar as recommended by provider. Freestyle Fallon blood glucose monitoring kit., Disp: 1 Kit, Rfl: 0  •  Blood Glucose Test Strips, Use one Freestyle Fallon strip to test blood sugar three times daily., Disp: 100 Strip, Rfl: 0  •  Lancets, Use one Freestyle Fallon lancet to test blood sugar three times daily., Disp: 100 Each, Rfl: 0  •  Vilazodone HCl (VIIBRYD) 40 MG Tab, Take 40 mg by mouth every day for 180 days., Disp: 90 Tablet, Rfl: 3  •  lamoTRIgine (LAMICTAL) 100 MG Tab, Take 1 tablet by mouth in the morning and 2 tablets in the evening, Disp: 270 Tablet, Rfl: 1    Labs: Reviewed    Physical Examination:  Vital signs: There were no vitals taken for this visit. There is no height or weight on file to calculate BMI.    Assessment and Plan:    1. DM2  • A1c has improved from 8.4 to 6.8 in the past 60 days  • Starting weight 280 lbs  • Pt wishes to continue lifestyle changed  • Pt is currently optmized on metformin 100mg po  bid  •     - Medication changes:  • none     - Lifestyle changes:  • Pt has made significant lifestyle changes in the past two months.  • Pt would benefit from daily dedicated walking  • Pt to continue to completely abstain from any sugar sweetened beverages including juice  • Pt continues to make good choices with diet    No follow-ups on file.    Leni Aguayo  02/10/22    CC:   MILLER Jackson

## 2022-03-17 ENCOUNTER — PATIENT MESSAGE (OUTPATIENT)
Dept: MEDICAL GROUP | Facility: PHYSICIAN GROUP | Age: 37
End: 2022-03-17
Payer: COMMERCIAL

## 2022-03-17 DIAGNOSIS — F32.A DEPRESSIVE DISORDER: ICD-10-CM

## 2022-03-17 RX ORDER — LAMOTRIGINE 100 MG/1
TABLET ORAL
Qty: 270 TABLET | Refills: 1 | Status: SHIPPED | OUTPATIENT
Start: 2022-03-17 | End: 2022-03-25 | Stop reason: SDUPTHER

## 2022-03-25 ENCOUNTER — OFFICE VISIT (OUTPATIENT)
Dept: MEDICAL GROUP | Facility: PHYSICIAN GROUP | Age: 37
End: 2022-03-25
Payer: COMMERCIAL

## 2022-03-25 VITALS
RESPIRATION RATE: 16 BRPM | BODY MASS INDEX: 39.34 KG/M2 | OXYGEN SATURATION: 96 % | DIASTOLIC BLOOD PRESSURE: 72 MMHG | WEIGHT: 265.6 LBS | HEIGHT: 69 IN | SYSTOLIC BLOOD PRESSURE: 110 MMHG | TEMPERATURE: 97.8 F | HEART RATE: 88 BPM

## 2022-03-25 DIAGNOSIS — F32.A DEPRESSIVE DISORDER: ICD-10-CM

## 2022-03-25 DIAGNOSIS — F33.0 MILD EPISODE OF RECURRENT MAJOR DEPRESSIVE DISORDER (HCC): ICD-10-CM

## 2022-03-25 DIAGNOSIS — F31.81 BIPOLAR 2 DISORDER (HCC): ICD-10-CM

## 2022-03-25 PROCEDURE — 99214 OFFICE O/P EST MOD 30 MIN: CPT | Performed by: NURSE PRACTITIONER

## 2022-03-25 RX ORDER — ESCITALOPRAM OXALATE 10 MG/1
10-20 TABLET ORAL DAILY
Qty: 60 TABLET | Refills: 0 | Status: SHIPPED | OUTPATIENT
Start: 2022-03-25 | End: 2022-04-26 | Stop reason: SDUPTHER

## 2022-03-25 RX ORDER — LAMOTRIGINE 100 MG/1
200 TABLET ORAL 2 TIMES DAILY
Qty: 270 TABLET | Refills: 1 | Status: SHIPPED | OUTPATIENT
Start: 2022-03-25 | End: 2023-06-13 | Stop reason: SDUPTHER

## 2022-03-25 NOTE — ASSESSMENT & PLAN NOTE
Chronic problem. Was seen by psy and Dx'd with type 2 bipolar and MDD. On lamictal 100 mg in am and 200 mg in pm as well as Viibryd 40 mg daily x 5 yrs, was working well, however still struggled with depression. Was off of Viibryd due to insurance coverage.   Has been tapering off lamictal as well d/t concerns of tolerance. Noticing negative SE, increasing low moods.   Would like to restart lamotrigine and alternative to Viibryd due to cost.  Otherwise doing well with DM, A1C is improving, denies SI, HI.  In the past has tried Prozac.  Recent labs WNL, vitamin D however was at 16, on a supplement.

## 2022-03-25 NOTE — PROGRESS NOTES
Chief Complaint   Patient presents with   • Other     Wants to change antidepressant        HISTORY OF PRESENT ILLNESS: Patient is a 36 y.o. male, established patient who presents today to discuss medical problems as listed below:    Health Maintenance:  COMPLETED     Depressive disorder  Chronic problem. Was seen by psy and Belinda'd with type 2 bipolar and MDD. On lamictal 100 mg in am and 200 mg in pm as well as Viibryd 40 mg daily x 5 yrs, was working well, however still struggled with depression. Was off of Viibryd due to insurance coverage.   Has been tapering off lamictal as well d/t concerns of tolerance. Noticing negative SE, increasing low moods.   Would like to restart lamotrigine and alternative to Viibryd due to cost.  Otherwise doing well with DM, A1C is improving, denies SI, HI.  In the past has tried Prozac      Patient Active Problem List    Diagnosis Date Noted   • Body mass index (BMI)40.0-44.9, adult 08/16/2021   • Acute right-sided thoracic back pain 04/19/2021   • RIK (obstructive sleep apnea) 07/07/2020   • Depressive disorder 07/07/2020   • Chronic fatigue 07/07/2020   • Elevated ALT measurement 07/07/2020   • Vitamin D deficiency 07/07/2020   • Type 2 diabetes mellitus without complication, without long-term current use of insulin ([facility]) 06/08/2020   • Dyslipidemia 06/08/2020   • Class 3 severe obesity due to excess calories without serious comorbidity with body mass index (BMI) of 40.0 to 44.9 in adult ([facility]) 06/08/2020   • Fatty liver 06/08/2020        Allergies: Patient has no known allergies.    Current Outpatient Medications   Medication Sig Dispense Refill   • escitalopram (LEXAPRO) 10 MG Tab Take 1-2 Tablets by mouth every day. 60 Tablet 0   • lamoTRIgine (LAMICTAL) 100 MG Tab Take 1 tablet by mouth in the morning and 2 tablets in the evening 270 Tablet 1   • atorvastatin (LIPITOR) 10 MG Tab Take 1 Tablet by mouth every day. 90 Tablet 3   • metFORMIN (GLUCOPHAGE) 500 MG Tab Take 2 Tablets by  "mouth 2 times a day with meals. 360 Tablet 0   • Blood Glucose Meter Kit Test blood sugar as recommended by provider. Freestyle Kushal blood glucose monitoring kit. 1 Kit 0   • Blood Glucose Test Strips Use one Freestyle Kushal strip to test blood sugar three times daily. 100 Strip 0   • Lancets Use one Freestyle Kushal lancet to test blood sugar three times daily. 100 Each 0   • Vilazodone HCl (VIIBRYD) 40 MG Tab Take 40 mg by mouth every day for 180 days. (Patient not taking: Reported on 3/25/2022) 90 Tablet 3     No current facility-administered medications for this visit.       Social History     Tobacco Use   • Smoking status: Former Smoker     Quit date: 2014     Years since quittin.8   • Smokeless tobacco: Never Used   Vaping Use   • Vaping Use: Never used   Substance Use Topics   • Alcohol use: Not Currently   • Drug use: Never     Social History     Social History Narrative   • Not on file       Family History   Problem Relation Age of Onset   • No Known Problems Mother        Allergies, past medical history, past surgical history, family history, social history reviewed and updated.    Review of Systems:     - Constitutional: Negative for fever, chills, unexpected weight change, and fatigue/generalized weakness.     - Respiratory: Negative for cough, sputum production, chest congestion, dyspnea, wheezing, and crackles.      - Cardiovascular: Negative for chest pain, palpitations, orthopnea, and bilateral lower extremity edema.     - Psychiatric/Behavioral: depression. Negative for  suicidal/homicidal ideation and memory loss.      All other systems reviewed and are negative    Exam:    /72   Pulse 88   Temp 36.6 °C (97.8 °F) (Temporal)   Resp 16   Ht 1.753 m (5' 9\")   Wt 120 kg (265 lb 9.6 oz)   SpO2 96%   BMI 39.22 kg/m²  Body mass index is 39.22 kg/m².    Physical Exam:  Constitutional: Well-developed and well-nourished. Not diaphoretic. No distress.   Cardiovascular: Regular rate and " rhythm, S1 and S2 without murmur, rubs, or gallops.    Chest: Effort normal. Clear to auscultation throughout. No adventitious sounds.   Neurological: Alert and oriented x 3.   Psychiatric:  Behavior, mood, and affect are appropriate.  MA/nursing note and vitals reviewed.    LABS: 2022  results reviewed and discussed with the patient, questions answered.     Assessment/Plan:  1. Depressive disorder  Lamotrigine will resume for maintenance of bipolar.  Trial of Lexapro for MDD, referral to psychiatry placed.  If not tolerated, feeling worse or experiencing SI, stopped taking and report to the clinic immediately.  If tolerating well okay to increase to 20 mg daily.  We will follow-up in 3 to 4 weeks.  - escitalopram (LEXAPRO) 10 MG Tab; Take 1-2 Tablets by mouth every day.  Dispense: 60 Tablet; Refill: 0       Discussed with patient possible alternative diagnoses, pt is to take all medications as prescribed. If symptoms persist FU w/PCP, if symptoms worsen go to emergency room. If experiencing any side effects from prescribed medications reports to the office immediately or go to emergency room.  Reviewed indication, dosage, usage and potential adverse effects of prescribed medications. Reviewed risks and benefits of treatment plan. Patient verbalizes understanding of all instruction and verbally agrees to plan.    No follow-ups on file. 3-4 wks

## 2022-04-02 ENCOUNTER — PATIENT MESSAGE (OUTPATIENT)
Dept: MEDICAL GROUP | Facility: PHYSICIAN GROUP | Age: 37
End: 2022-04-02
Payer: COMMERCIAL

## 2022-04-02 DIAGNOSIS — E11.9 TYPE 2 DIABETES MELLITUS WITHOUT COMPLICATION, WITHOUT LONG-TERM CURRENT USE OF INSULIN (HCC): ICD-10-CM

## 2022-04-02 DIAGNOSIS — E78.5 DYSLIPIDEMIA: ICD-10-CM

## 2022-04-04 NOTE — PATIENT COMMUNICATION
Received request via: Patient    Was the patient seen in the last year in this department? Yes    Does the patient have an active prescription (recently filled or refills available) for medication(s) requested? No - old olders requested to be DC'd by pcp

## 2022-04-05 RX ORDER — ATORVASTATIN CALCIUM 10 MG/1
10 TABLET, FILM COATED ORAL DAILY
Qty: 90 TABLET | Refills: 2 | Status: SHIPPED | OUTPATIENT
Start: 2022-04-05 | End: 2023-03-01 | Stop reason: SDUPTHER

## 2022-04-29 ENCOUNTER — TELEMEDICINE (OUTPATIENT)
Dept: MEDICAL GROUP | Facility: PHYSICIAN GROUP | Age: 37
End: 2022-04-29
Payer: COMMERCIAL

## 2022-04-29 VITALS — HEIGHT: 69 IN | BODY MASS INDEX: 41.32 KG/M2 | WEIGHT: 279 LBS

## 2022-04-29 DIAGNOSIS — F32.A DEPRESSIVE DISORDER: ICD-10-CM

## 2022-04-29 PROCEDURE — 99213 OFFICE O/P EST LOW 20 MIN: CPT | Mod: 95 | Performed by: NURSE PRACTITIONER

## 2022-04-29 RX ORDER — ESCITALOPRAM OXALATE 10 MG/1
10-20 TABLET ORAL DAILY
Qty: 90 TABLET | Refills: 1 | Status: SHIPPED | OUTPATIENT
Start: 2022-04-29 | End: 2023-06-13

## 2022-04-29 RX ORDER — ERGOCALCIFEROL (VITAMIN D2) 10 MCG
TABLET ORAL
COMMUNITY

## 2022-04-29 NOTE — PROGRESS NOTES
Telemedicine Visit: Established Patient     This encounter was conducted via Zoom.   Verbal consent was obtained. Patient's identity was verified.    Subjective:     Chief Complaint   Patient presents with   • Follow-Up     On meds     Leroy Naylor is a 36 y.o. male presenting for evaluation and management of following problems:    Depressive disorder  Started Lexapro last visit, now taking 20 mg in a.m., states working very well.  Denies depression or SI.  He is scheduled to see psychiatry at behavioral health in July.  He would like to continue Lexapro.      ROS   Denies any recent fevers or chills. No nausea or vomiting. No chest pains or shortness of breath.     No Known Allergies    Current medicines (including changes today)  Current Outpatient Medications   Medication Sig Dispense Refill   • vitamin D, cholecalciferol, 400 units Tab tablet      • escitalopram (LEXAPRO) 10 MG Tab Take 1-2 Tablets by mouth every day. 90 Tablet 1   • atorvastatin (LIPITOR) 10 MG Tab Take 1 Tablet by mouth every day. 90 Tablet 2   • metFORMIN (GLUCOPHAGE) 500 MG Tab Take 2 Tablets by mouth 2 times a day with meals. 360 Tablet 2   • lamoTRIgine (LAMICTAL) 100 MG Tab Take 2 Tablets by mouth 2 times a day. Take 1 tablet by mouth in the morning and 2 tablets in the evening 270 Tablet 1     No current facility-administered medications for this visit.       Patient Active Problem List    Diagnosis Date Noted   • Body mass index (BMI)40.0-44.9, adult 08/16/2021   • Acute right-sided thoracic back pain 04/19/2021   • RIK (obstructive sleep apnea) 07/07/2020   • Depressive disorder 07/07/2020   • Chronic fatigue 07/07/2020   • Elevated ALT measurement 07/07/2020   • Vitamin D deficiency 07/07/2020   • Type 2 diabetes mellitus without complication, without long-term current use of insulin ([facility]) 06/08/2020   • Dyslipidemia 06/08/2020   • Class 3 severe obesity due to excess calories without serious comorbidity with body mass index (BMI)  "of 40.0 to 44.9 in adult ([facility]) 06/08/2020   • Fatty liver 06/08/2020       Family History   Problem Relation Age of Onset   • No Known Problems Mother        He  has a past medical history of Bipolar 2 disorder (HCC), Diabetes (HCC), Sleep apnea, and Thyroid disease.  He  has no past surgical history on file.       Objective:   Vitals obtained by patient:  Ht 1.753 m (5' 9\")   Wt (!) 127 kg (279 lb)   BMI 41.20 kg/m²      Physical Exam:  General: No acute distress. Well nourished.   HEENT: EOM grossly intact, no scleral icterus, no pharyngeal erythema.   Neck:  No JVD noted at 90 degrees, trachea midline  CVS: Pulse as reported by patient, no visible LE edema.  Resp: Unlabored respiratory effort, no cough or audible wheeze  MSK/Ext: No clubbing or cyanosis visible appreciated.  Skin: No rashes in visible areas.  Neurological: AOx3. CN III-XII grossly intact. No focal deficits.     LABS: 12/2021, 2/2022  results reviewed and discussed with the patient, questions answered.    Assessment and Plan:   1. Depressive disorder  Stable on current regimen.  Continue.  Refills given.  Patient to see psychiatry in July and follow-up with me after his appointment.  - escitalopram (LEXAPRO) 10 MG Tab; Take 1-2 Tablets by mouth every day.  Dispense: 90 Tablet; Refill: 1       Follow-up: No follow-ups on file.          "

## 2022-04-29 NOTE — ASSESSMENT & PLAN NOTE
Started Lexapro last visit, now taking 20 mg in a.m., states working very well.  Denies depression or SI.  He is scheduled to see psychiatry at behavioral health in July.  He would like to continue Lexapro.

## 2022-05-13 ENCOUNTER — TELEPHONE (OUTPATIENT)
Dept: VASCULAR LAB | Facility: MEDICAL CENTER | Age: 37
End: 2022-05-13
Payer: COMMERCIAL

## 2022-05-13 NOTE — TELEPHONE ENCOUNTER
Left VM message to reschedule cancelled DM fu with the pharmacist  Leni Aguayo, Clinical Pharmacist, CDE, CACP

## 2022-06-30 ENCOUNTER — APPOINTMENT (OUTPATIENT)
Dept: SLEEP MEDICINE | Facility: MEDICAL CENTER | Age: 37
End: 2022-06-30

## 2022-07-14 ENCOUNTER — DOCUMENTATION (OUTPATIENT)
Dept: BEHAVIORAL HEALTH | Facility: CLINIC | Age: 37
End: 2022-07-14

## 2023-03-01 ENCOUNTER — OFFICE VISIT (OUTPATIENT)
Dept: MEDICAL GROUP | Facility: PHYSICIAN GROUP | Age: 38
End: 2023-03-01
Payer: COMMERCIAL

## 2023-03-01 VITALS
RESPIRATION RATE: 14 BRPM | WEIGHT: 264.2 LBS | OXYGEN SATURATION: 96 % | HEART RATE: 89 BPM | DIASTOLIC BLOOD PRESSURE: 78 MMHG | SYSTOLIC BLOOD PRESSURE: 132 MMHG | TEMPERATURE: 96.9 F | BODY MASS INDEX: 39.13 KG/M2 | HEIGHT: 69 IN

## 2023-03-01 DIAGNOSIS — R74.01 ELEVATED ALT MEASUREMENT: ICD-10-CM

## 2023-03-01 DIAGNOSIS — K76.0 FATTY LIVER: ICD-10-CM

## 2023-03-01 DIAGNOSIS — G47.33 OSA (OBSTRUCTIVE SLEEP APNEA): ICD-10-CM

## 2023-03-01 DIAGNOSIS — E66.9 OBESITY (BMI 30-39.9): ICD-10-CM

## 2023-03-01 DIAGNOSIS — F32.A DEPRESSIVE DISORDER: ICD-10-CM

## 2023-03-01 DIAGNOSIS — Z00.00 ANNUAL PHYSICAL EXAM: ICD-10-CM

## 2023-03-01 DIAGNOSIS — E55.9 VITAMIN D DEFICIENCY: ICD-10-CM

## 2023-03-01 DIAGNOSIS — E11.9 TYPE 2 DIABETES MELLITUS WITHOUT COMPLICATION, WITHOUT LONG-TERM CURRENT USE OF INSULIN (HCC): ICD-10-CM

## 2023-03-01 DIAGNOSIS — E66.01 CLASS 2 SEVERE OBESITY DUE TO EXCESS CALORIES WITH SERIOUS COMORBIDITY AND BODY MASS INDEX (BMI) OF 39.0 TO 39.9 IN ADULT (HCC): ICD-10-CM

## 2023-03-01 DIAGNOSIS — E78.5 DYSLIPIDEMIA: ICD-10-CM

## 2023-03-01 DIAGNOSIS — R53.82 CHRONIC FATIGUE: ICD-10-CM

## 2023-03-01 DIAGNOSIS — F31.81 BIPOLAR 2 DISORDER (HCC): ICD-10-CM

## 2023-03-01 PROBLEM — M54.6 ACUTE RIGHT-SIDED THORACIC BACK PAIN: Status: RESOLVED | Noted: 2021-04-19 | Resolved: 2023-03-01

## 2023-03-01 PROBLEM — E66.09 OBESITY DUE TO EXCESS CALORIES WITH SERIOUS COMORBIDITY: Status: ACTIVE | Noted: 2023-03-01

## 2023-03-01 PROCEDURE — 99214 OFFICE O/P EST MOD 30 MIN: CPT | Performed by: NURSE PRACTITIONER

## 2023-03-01 RX ORDER — ZOLPIDEM TARTRATE 5 MG/1
5 TABLET ORAL NIGHTLY PRN
COMMUNITY
End: 2023-06-13

## 2023-03-01 RX ORDER — ARIPIPRAZOLE 10 MG/1
10 TABLET ORAL DAILY
COMMUNITY
End: 2023-06-13

## 2023-03-01 RX ORDER — LAMOTRIGINE 200 MG/1
TABLET ORAL
COMMUNITY
Start: 2022-10-06 | End: 2023-03-01

## 2023-03-01 RX ORDER — ATORVASTATIN CALCIUM 10 MG/1
10 TABLET, FILM COATED ORAL DAILY
Qty: 90 TABLET | Refills: 0 | Status: SHIPPED | OUTPATIENT
Start: 2023-03-01

## 2023-03-01 ASSESSMENT — ENCOUNTER SYMPTOMS
DEPRESSION: 0
EYES NEGATIVE: 1
NAUSEA: 0
BACK PAIN: 1
CARDIOVASCULAR NEGATIVE: 1
WEAKNESS: 1
TINGLING: 1
INSOMNIA: 1
HEARTBURN: 1
VOMITING: 0
DIARRHEA: 0
CONSTIPATION: 0
RESPIRATORY NEGATIVE: 1

## 2023-03-01 ASSESSMENT — PATIENT HEALTH QUESTIONNAIRE - PHQ9: CLINICAL INTERPRETATION OF PHQ2 SCORE: 0

## 2023-03-01 NOTE — ASSESSMENT & PLAN NOTE
Chronic and stable   Was previously on Provigil but due to insurance and provider stopped and had not pursued agaon

## 2023-03-01 NOTE — ASSESSMENT & PLAN NOTE
Was following with psych until insurance chaged  Takes Lamictal 100mg in AM 200mg at night  Takes lexapro 10 mg daily   abilify 10 mg daily

## 2023-03-01 NOTE — ASSESSMENT & PLAN NOTE
Chronic and stable condition   Was following with psych until insurance changed  Takes Lamictal 100mg in AM 200mg at night  Takes lexapro 10 mg daily   abilify 10 mg daily

## 2023-03-01 NOTE — ASSESSMENT & PLAN NOTE
Chronic and stable  Last last labs 2022  Needs new labs  Currently controlled with metformin, lifestyle, diet and exercise.   Retinal exam completed: needs new one  LDL: needs new labs  ACEi/ARB? none  Statin? Atorvastatin 10 mg daily   Urine Albumin/creatinine: new orders reviewed  Tobacco use: quit 2014  BP control: controlled  Concomitant HTN? none  A1C 6.8 % 2022  A1C due: new orders placed  Last monofilament exam- will do at next visit- notes numbness in legs and feet  Patient tolerates medications well with no significant or bothersome side effects.   Denies polyuria, polydipsia, polyphagia.

## 2023-03-01 NOTE — PROGRESS NOTES
Chief Complaint   Patient presents with    Establish Care    Referral Needed    Medication Refill     metFORMIN, atorvastatin       Subjective:     Leroy Naylor is a 37 y.o. male presenting for       Vitamin D deficiency  OTC supplements  Needs new labs    Type 2 diabetes mellitus without complication, without long-term current use of insulin (HCC)  Chronic and stable  Last last labs 2022  Needs new labs  Currently controlled with metformin, lifestyle, diet and exercise.   Retinal exam completed: needs new one  LDL: needs new labs  ACEi/ARB? none  Statin? Atorvastatin 10 mg daily   Urine Albumin/creatinine: new orders reviewed  Tobacco use: quit 2014  BP control: controlled  Concomitant HTN? none  A1C 6.8 % 2022  A1C due: new orders placed  Last monofilament exam- will do at next visit- notes numbness in legs and feet  Patient tolerates medications well with no significant or bothersome side effects.   Denies polyuria, polydipsia, polyphagia.         RIK (obstructive sleep apnea)  Chronic condition   Diagnosed [id number]  Uses CPAP daily   Referrals requested    Elevated ALT measurement  Noted on old labs  Needs new labs    Dyslipidemia  Takes atorvastatin 10mg daily   History of DMT2      Depressive disorder  Was following with psych until insurance chaged  Takes Lamictal 100mg in AM 200mg at night  Takes lexapro 10 mg daily   abilify 10 mg daily     Bipolar 2 disorder (HCC)  Chronic and stable condition   Was following with psych until insurance changed  Takes Lamictal 100mg in AM 200mg at night  Takes lexapro 10 mg daily   abilify 10 mg daily       Chronic fatigue  Chronic and stable   Was previously on Provigil but due to insurance and provider stopped and had not pursued agaon    Fatty liver  historyof fatty liver  Labs in past show elevated liver ensymzes  Needs new labs     Review of Systems   Constitutional:  Positive for malaise/fatigue.   HENT: Negative.     Eyes: Negative.    Respiratory: Negative.      Cardiovascular: Negative.    Gastrointestinal:  Positive for heartburn. Negative for constipation, diarrhea, nausea and vomiting.        Diet related   Genitourinary: Negative.    Musculoskeletal:  Positive for back pain.        The way he sleeps   Skin: Negative.    Neurological:  Positive for tingling and weakness.        Lower extremities have worsened shai last 2 years  Seems to be dropping items on left side    Psychiatric/Behavioral:  Negative for depression and suicidal ideas. The patient has insomnia.         Baseline- occasional         Current Outpatient Medications:     ARIPiprazole (ABILIFY) 10 MG Tab, Take 10 mg by mouth every day., Disp: , Rfl:     zolpidem (AMBIEN) 5 MG Tab, Take 5 mg by mouth at bedtime as needed for Sleep., Disp: , Rfl:     atorvastatin (LIPITOR) 10 MG Tab, Take 1 Tablet by mouth every day., Disp: 90 Tablet, Rfl: 0    metFORMIN (GLUCOPHAGE) 500 MG Tab, Take 2 Tablets by mouth 2 times a day with meals., Disp: 360 Tablet, Rfl: 3    vitamin D, cholecalciferol, 400 units Tab tablet, , Disp: , Rfl:     escitalopram (LEXAPRO) 10 MG Tab, Take 1-2 Tablets by mouth every day., Disp: 90 Tablet, Rfl: 1    lamoTRIgine (LAMICTAL) 100 MG Tab, Take 2 Tablets by mouth 2 times a day. Take 1 tablet by mouth in the morning and 2 tablets in the evening (Patient taking differently: Take 300 mg by mouth 2 times a day. Take 1 tablet by mouth in the morning and 2 tablets in the evening), Disp: 270 Tablet, Rfl: 1    Past Medical History:   Diagnosis Date    Bipolar 2 disorder (HCC)     Body mass index (BMI)40.0-44.9, adult 2021    Diabetes (HCC)     Sleep apnea        History reviewed. No pertinent surgical history.    Social History     Tobacco Use    Smoking status: Former     Packs/day: 1.00     Years: 15.00     Pack years: 15.00     Types: Cigarettes     Quit date: 2014     Years since quittin.7    Smokeless tobacco: Never   Vaping Use    Vaping Use: Never used   Substance Use Topics     "Alcohol use: Not Currently    Drug use: Never       Family History   Problem Relation Age of Onset    No Known Problems Mother     Heart Disease Father     Diabetes Father     No Known Problems Sister     No Known Problems Sister     No Known Problems Brother     Multiple Sclerosis Maternal Aunt     Multiple Sclerosis Maternal Uncle     Cancer Maternal Grandmother         uterine    Diabetes Maternal Grandmother     Atrial fibrillation Maternal Grandmother     Heart Attack Maternal Grandfather 60    Pancreatic Cancer Maternal Grandfather        Patient has no known allergies.    Allergies, past medical history, past surgical history, family history, social history reviewed and updated    Objective:     Vitals: /78 (BP Location: Left arm, Patient Position: Sitting, BP Cuff Size: Adult)   Pulse 89   Temp 36.1 °C (96.9 °F) (Temporal)   Resp 14   Ht 1.753 m (5' 9\")   Wt 120 kg (264 lb 3.2 oz)   SpO2 96%   BMI 39.02 kg/m²   General: Alert, pleasant, NAD  EYES:   PERRL, EOMI, no icterus or pallor.  Conjunctivae and lids normal.   HENT:  Normocephalic.  External ears normal. Tympanic membranes pearly, opaque.  No nasal drainage present.  Oropharynx non-erythematous, mucous membranes moist.  Neck supple.   No thyromegaly or masses palpated. No cervical or supraclavicular lymphadenopathy.  Heart: Regular rate and rhythm.  S1 and S2 normal.  No murmurs appreciated.  Respiratory: Normal respiratory effort.  Clear to auscultation bilaterally.  Musculoskeletal: Gait is normal.  Moves all extremities well.    Extremities: No clubbing, cyanosis or edema noted.   Neurological: No tremors, sensation grossly intact, tone/strength normal, gait is normal, CN2-12 intact.  Psych:  Affect/mood is normal, judgement is good, memory is intact, grooming is appropriate.    Assessment/Plan:     1. Type 2 diabetes mellitus without complication, without long-term current use of insulin ([facility])  - Microalbumin Creat Ratio Urine (Lab " Collect); Future  - Hemoglobin A1C; Future  - Comp Metabolic Panel; Future  - Lipid Profile; Future  - metFORMIN (GLUCOPHAGE) 500 MG Tab; Take 2 Tablets by mouth 2 times a day with meals.  Dispense: 360 Tablet; Refill: 3    2. Dyslipidemia  - Lipid Profile; Future  - atorvastatin (LIPITOR) 10 MG Tab; Take 1 Tablet by mouth every day.  Dispense: 90 Tablet; Refill: 0    3. Elevated ALT measurement  - Comp Metabolic Panel; Future    4. Vitamin D deficiency  - VITAMIN D,25 HYDROXY (DEFICIENCY); Future    5. Annual physical exam    - Comp Metabolic Panel; Future  - CBC WITHOUT DIFFERENTIAL; Future  - Lipid Profile; Future  - VITAMIN D,25 HYDROXY (DEFICIENCY); Future  - TSH WITH REFLEX TO FT4; Future    6. RIK (obstructive sleep apnea)  - Referral to Pulmonary and Sleep Medicine    7. Depressive disorder  - Referral to Psychiatry    8. Bipolar 2 disorder (HCC)  - Referral to Psychiatry    9. Obesity (BMI 30-39.9)  - Patient identified as having weight management issue.  Appropriate orders and counseling given.    10. Class 2 severe obesity due to excess calories with serious comorbidity and body mass index (BMI) of 39.0 to 39.9 in adult (HCC)  - Patient identified as having weight management issue.  Appropriate orders and counseling given.    11. Chronic fatigue    12. Fatty liver         Discussed with patient possible alternative diagnoses, patient is to take all medications as prescribed.     If symptoms persist FU w/PCP, if symptoms worsen go to emergency room.     If experiencing any side effects from prescribed medications reports to the office immediately or go to emergency room.    Reviewed indication, dosage, usage and potential adverse effects of prescribed medications.     Reviewed risks and benefits of treatment plan. Patient verbalizes understanding of all instruction and verbally agrees to plan.    Discussed plan with the patient, and she agrees to the above.      I personally reviewed prior external notes  and test results pertinent to today's visit.        Return in about 3 weeks (around 3/22/2023) for follow up labs and DM.      Please note that this dictation was created using voice recognition software. I have made every reasonable attempt to correct obvious errors, but I expect that there may be errors of grammar and possibly content that I did not discover before finalizing the note.

## 2023-03-06 ENCOUNTER — TELEPHONE (OUTPATIENT)
Dept: HEALTH INFORMATION MANAGEMENT | Facility: OTHER | Age: 38
End: 2023-03-06
Payer: COMMERCIAL

## 2023-03-15 LAB — HBA1C MFR BLD: 5.9 % (ref ?–5.8)

## 2023-03-16 ENCOUNTER — TELEPHONE (OUTPATIENT)
Dept: HEALTH INFORMATION MANAGEMENT | Facility: OTHER | Age: 38
End: 2023-03-16
Payer: COMMERCIAL

## 2023-03-21 ENCOUNTER — OFFICE VISIT (OUTPATIENT)
Dept: MEDICAL GROUP | Facility: PHYSICIAN GROUP | Age: 38
End: 2023-03-21
Payer: COMMERCIAL

## 2023-03-21 ENCOUNTER — HOSPITAL ENCOUNTER (OUTPATIENT)
Facility: MEDICAL CENTER | Age: 38
End: 2023-03-21
Attending: NURSE PRACTITIONER
Payer: COMMERCIAL

## 2023-03-21 VITALS
RESPIRATION RATE: 16 BRPM | SYSTOLIC BLOOD PRESSURE: 122 MMHG | OXYGEN SATURATION: 94 % | HEART RATE: 85 BPM | TEMPERATURE: 96.7 F | DIASTOLIC BLOOD PRESSURE: 74 MMHG | WEIGHT: 258.4 LBS | HEIGHT: 69 IN | BODY MASS INDEX: 38.27 KG/M2

## 2023-03-21 DIAGNOSIS — E11.9 TYPE 2 DIABETES MELLITUS WITHOUT COMPLICATION, WITHOUT LONG-TERM CURRENT USE OF INSULIN (HCC): ICD-10-CM

## 2023-03-21 DIAGNOSIS — R20.0 BILATERAL HAND NUMBNESS: ICD-10-CM

## 2023-03-21 DIAGNOSIS — R80.9 MICROALBUMINURIA: ICD-10-CM

## 2023-03-21 DIAGNOSIS — R63.4 WEIGHT LOSS, UNINTENTIONAL: ICD-10-CM

## 2023-03-21 DIAGNOSIS — E66.09 CLASS 2 OBESITY DUE TO EXCESS CALORIES WITHOUT SERIOUS COMORBIDITY WITH BODY MASS INDEX (BMI) OF 38.0 TO 38.9 IN ADULT: ICD-10-CM

## 2023-03-21 PROBLEM — E66.812 CLASS 2 OBESITY DUE TO EXCESS CALORIES WITHOUT SERIOUS COMORBIDITY WITH BODY MASS INDEX (BMI) OF 38.0 TO 38.9 IN ADULT: Status: ACTIVE | Noted: 2020-06-08

## 2023-03-21 PROCEDURE — 82043 UR ALBUMIN QUANTITATIVE: CPT

## 2023-03-21 PROCEDURE — 82570 ASSAY OF URINE CREATININE: CPT

## 2023-03-21 PROCEDURE — 99214 OFFICE O/P EST MOD 30 MIN: CPT | Performed by: NURSE PRACTITIONER

## 2023-03-21 ASSESSMENT — ENCOUNTER SYMPTOMS
SHORTNESS OF BREATH: 0
FEVER: 0
TINGLING: 1
CHILLS: 0

## 2023-03-21 NOTE — PROGRESS NOTES
"CC:  Chief Complaint   Patient presents with    Lab Results       HISTORY OF PRESENT ILLNESS: Patient is a 37 y.o. 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  At this time we will have patient provided a urine sample and trend this     Type 2 Diabetes Mellitus Without Complication, Without Long-Term Current Use of Insulin ([facility])    Labs completed 3/15/2023 show hemoglobin A1c of 5.9%.  Patient continues to take metformin 1000 mg twice daily.      Class 2 Obesity Due to Excess Calories Without Serious Comorbidity With Body Mass Index (Bmi) of 38.0 to 38.9 in Adult    6 lb weight loss  BMI 38.16 weight 258.           Review of Systems   Constitutional:  Negative for chills and fever.   Respiratory:  Negative for shortness of breath.    Cardiovascular:  Negative for chest pain.   Neurological:  Positive for tingling.        Numbness to bilateral hands, wrists as well as upper thighs occasionally           - NOTE: All other systems reviewed and are negative, except as in HPI.      Exam:    /74   Pulse 85   Temp 35.9 °C (96.7 °F) (Temporal)   Resp 16   Ht 1.753 m (5' 9\")   Wt 117 kg (258 lb 6.4 oz)   SpO2 94%  Body mass index is 38.16 kg/m².    Physical Exam  Constitutional:       Appearance: Normal appearance. He is obese.   HENT:      Head: Normocephalic and atraumatic.   Pulmonary:      Effort: Pulmonary effort is normal.   Musculoskeletal:         " General: No swelling, tenderness, deformity or signs of injury. Normal range of motion.   Neurological:      Mental Status: He is alert.   Psychiatric:         Mood and Affect: Mood normal.         Behavior: Behavior normal.         Thought Content: Thought content normal.         Judgment: Judgment normal.         Assessment/Plan:    1. Weight loss, unintentional  Undiagnosed new condition uncertain prognosis  At this time we will continue to monitor.  Patient denies any change in his diet or exercise    2. Type 2 diabetes mellitus without complication, without long-term current use of insulin (HCC)  Chronic and stable condition  - MICROALBUMIN CREAT RATIO URINE; Future    3. Bilateral hand numbness  Undiagnosed new condition uncertain prognosis  - Referral to Neurodiagnostics (EEG,EP,EMG/NCS/DBS)    4. Class 2 obesity due to excess calories without serious comorbidity with body mass index (BMI) of 38.0 to 38.9 in adult  Chronic and stable condition    5. Microalbuminuria  Undiagnosed new condition uncertain prognosis  New urine ordered which we will trend.  If patient continues to have elevation his microalbumin creatinine then we will start him on either a ARB or ACE inhibitor      Discussed with patient possible alternative diagnoses, patient is to take all medications as prescribed.     If symptoms persist FU w/PCP, if symptoms worsen go to emergency room.     If experiencing any side effects from prescribed medications reports to the office immediately or go to emergency room.    Reviewed indication, dosage, usage and potential adverse effects of prescribed medications.     Reviewed risks and benefits of treatment plan. Patient verbalizes understanding of all instruction and verbally agrees to plan.      Return for Pending urine.      Please note that this dictation was created using voice recognition software. I have made every reasonable attempt to correct obvious errors, but I expect that there are errors  of grammar and possibly content that I did not discover before finalizing the note.

## 2023-03-22 LAB
CREAT UR-MCNC: 205.27 MG/DL
MICROALBUMIN UR-MCNC: 48.6 MG/DL
MICROALBUMIN/CREAT UR: 237 MG/G (ref 0–30)

## 2023-06-13 ENCOUNTER — OFFICE VISIT (OUTPATIENT)
Dept: BEHAVIORAL HEALTH | Facility: CLINIC | Age: 38
End: 2023-06-13
Payer: COMMERCIAL

## 2023-06-13 DIAGNOSIS — F41.1 GAD (GENERALIZED ANXIETY DISORDER): ICD-10-CM

## 2023-06-13 DIAGNOSIS — F33.2 SEVERE EPISODE OF RECURRENT MAJOR DEPRESSIVE DISORDER, WITHOUT PSYCHOTIC FEATURES (HCC): ICD-10-CM

## 2023-06-13 DIAGNOSIS — F31.81 BIPOLAR 2 DISORDER (HCC): ICD-10-CM

## 2023-06-13 DIAGNOSIS — F32.A DEPRESSIVE DISORDER: ICD-10-CM

## 2023-06-13 DIAGNOSIS — F33.0 MILD EPISODE OF RECURRENT MAJOR DEPRESSIVE DISORDER (HCC): ICD-10-CM

## 2023-06-13 PROCEDURE — 99205 OFFICE O/P NEW HI 60 MIN: CPT | Performed by: PSYCHIATRY & NEUROLOGY

## 2023-06-13 RX ORDER — LITHIUM CARBONATE 300 MG/1
300 TABLET, FILM COATED, EXTENDED RELEASE ORAL
Qty: 30 TABLET | Refills: 1 | Status: SHIPPED | OUTPATIENT
Start: 2023-06-13 | End: 2023-11-21

## 2023-06-13 RX ORDER — ESCITALOPRAM OXALATE 20 MG/1
20 TABLET ORAL DAILY
Qty: 30 TABLET | Refills: 1 | Status: SHIPPED | OUTPATIENT
Start: 2023-06-13 | End: 2023-11-21 | Stop reason: SDUPTHER

## 2023-06-13 RX ORDER — ARIPIPRAZOLE 5 MG/1
5 TABLET ORAL DAILY
Qty: 30 TABLET | Refills: 1 | Status: SHIPPED | OUTPATIENT
Start: 2023-06-13 | End: 2023-11-21 | Stop reason: SDUPTHER

## 2023-06-13 RX ORDER — LAMOTRIGINE 100 MG/1
TABLET ORAL
Qty: 90 TABLET | Refills: 1 | Status: SHIPPED | OUTPATIENT
Start: 2023-06-13 | End: 2023-10-23 | Stop reason: SDUPTHER

## 2023-06-13 ASSESSMENT — ANXIETY QUESTIONNAIRES
5. BEING SO RESTLESS THAT IT IS HARD TO SIT STILL: NOT AT ALL
2. NOT BEING ABLE TO STOP OR CONTROL WORRYING: MORE THAN HALF THE DAYS
3. WORRYING TOO MUCH ABOUT DIFFERENT THINGS: MORE THAN HALF THE DAYS
6. BECOMING EASILY ANNOYED OR IRRITABLE: NEARLY EVERY DAY
1. FEELING NERVOUS, ANXIOUS, OR ON EDGE: MORE THAN HALF THE DAYS
4. TROUBLE RELAXING: NEARLY EVERY DAY
7. FEELING AFRAID AS IF SOMETHING AWFUL MIGHT HAPPEN: MORE THAN HALF THE DAYS
IF YOU CHECKED OFF ANY PROBLEMS ON THIS QUESTIONNAIRE, HOW DIFFICULT HAVE THESE PROBLEMS MADE IT FOR YOU TO DO YOUR WORK, TAKE CARE OF THINGS AT HOME, OR GET ALONG WITH OTHER PEOPLE: SOMEWHAT DIFFICULT
GAD7 TOTAL SCORE: 14

## 2023-06-13 ASSESSMENT — PATIENT HEALTH QUESTIONNAIRE - PHQ9
CLINICAL INTERPRETATION OF PHQ2 SCORE: 5
5. POOR APPETITE OR OVEREATING: 2 - MORE THAN HALF THE DAYS
SUM OF ALL RESPONSES TO PHQ QUESTIONS 1-9: 23
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
## 2023-06-20 ENCOUNTER — APPOINTMENT (OUTPATIENT)
Dept: NEUROLOGY | Facility: MEDICAL CENTER | Age: 38
End: 2023-06-20
Attending: SPECIALIST
Payer: COMMERCIAL

## 2023-08-09 ENCOUNTER — APPOINTMENT (OUTPATIENT)
Dept: BEHAVIORAL HEALTH | Facility: CLINIC | Age: 38
End: 2023-08-09
Payer: COMMERCIAL

## 2023-09-27 ENCOUNTER — APPOINTMENT (OUTPATIENT)
Dept: SLEEP MEDICINE | Facility: MEDICAL CENTER | Age: 38
End: 2023-09-27
Attending: NURSE PRACTITIONER
Payer: COMMERCIAL

## 2023-10-23 DIAGNOSIS — F33.2 SEVERE EPISODE OF RECURRENT MAJOR DEPRESSIVE DISORDER, WITHOUT PSYCHOTIC FEATURES (HCC): ICD-10-CM

## 2023-10-24 RX ORDER — LAMOTRIGINE 100 MG/1
TABLET ORAL
Qty: 90 TABLET | Refills: 0 | Status: SHIPPED | OUTPATIENT
Start: 2023-10-24 | End: 2023-11-21 | Stop reason: SDUPTHER

## 2023-11-21 ENCOUNTER — OFFICE VISIT (OUTPATIENT)
Dept: BEHAVIORAL HEALTH | Facility: CLINIC | Age: 38
End: 2023-11-21
Payer: COMMERCIAL

## 2023-11-21 DIAGNOSIS — F41.1 GAD (GENERALIZED ANXIETY DISORDER): ICD-10-CM

## 2023-11-21 DIAGNOSIS — F33.2 SEVERE EPISODE OF RECURRENT MAJOR DEPRESSIVE DISORDER, WITHOUT PSYCHOTIC FEATURES (HCC): ICD-10-CM

## 2023-11-21 PROCEDURE — 99214 OFFICE O/P EST MOD 30 MIN: CPT | Performed by: PSYCHIATRY & NEUROLOGY

## 2023-11-21 RX ORDER — ARIPIPRAZOLE 5 MG/1
5 TABLET ORAL DAILY
Qty: 90 TABLET | Refills: 2 | Status: SHIPPED | OUTPATIENT
Start: 2023-11-21

## 2023-11-21 RX ORDER — LAMOTRIGINE 100 MG/1
TABLET ORAL
Qty: 270 TABLET | Refills: 2 | Status: SHIPPED | OUTPATIENT
Start: 2023-11-21 | End: 2023-11-22

## 2023-11-21 RX ORDER — ESCITALOPRAM OXALATE 20 MG/1
20 TABLET ORAL DAILY
Qty: 90 TABLET | Refills: 2 | Status: SHIPPED | OUTPATIENT
Start: 2023-11-21

## 2023-11-21 NOTE — PROGRESS NOTES
PSYCHIATRY FOLLOW-UP NOTE      Name: Leroy Naylor  MRN: [id number]  : 1985  Age: 38 y.o.  Date of assessment: 2023  PCP: GLADIS Zamorano.  Persons in attendance: Patient      REASON FOR VISIT/CHIEF COMPLAINT (as stated by Patient):  Leroy Naylor is a 38 y.o., White male, attending follow-up appointment for mood and anxiety management.      HISTORY OF PRESENT ILLNESS:  Leroy Naylor is a 38 y.o. old male with MDD & ZULMA comes in today for follow up. Patient was last seen 5 months ago, and following treatment planning recommendations were done:  Continue Lexapro 20 mg daily for mood and anxiety.  Continue Lamictal 100 mg AM- 200 mg HS for mood stabilization.  Add Lithium  mg daily for mood stabilization.  Increase Abilify to 5 mg daily for mood stabilization.  Patient not interested in psychotherapy.        Patient has not began lithium after talking to friends who have used lithium.  Patient continues to struggle with taking medications on a daily basis and remained focused on not doing medication changes but he wants to try remaining consistent first to assess if changes are indicated.  Patient continues to verbalize chronic depression but describes feeling safe and describes her daughter as a protective factor.  Finances are major concern and [facility] do not take his insurance.  Agreed with calling his insurance and finding a psychiatrist who will take his insurance.  Agreed with getting 90-day supply with refills until he can find a new psychiatrist.      CURRENT MEDICATIONS:  Current Outpatient Medications   Medication Sig Dispense Refill    lamoTRIgine (LAMICTAL) 100 MG Tab Take 1 tablet by mouth in the morning and 2 tablets in the evening for 30 days. 90 Tablet 0    escitalopram (LEXAPRO) 20 MG tablet Take 1 Tablet by mouth every day. 30 Tablet 1    ARIPiprazole (ABILIFY) 5 MG tablet Take 1 Tablet by mouth every day. 30 Tablet 1    lithium carbonate  MG Tab CR  tablet Take 1 Tablet by mouth 1/2 hour after dinner. 30 Tablet 1    atorvastatin (LIPITOR) 10 MG Tab Take 1 Tablet by mouth every day. 90 Tablet 0    metFORMIN (GLUCOPHAGE) 500 MG Tab Take 2 Tablets by mouth 2 times a day with meals. 360 Tablet 3    vitamin D, cholecalciferol, 400 units Tab tablet        No current facility-administered medications for this visit.       MEDICAL HISTORY  Past Medical History:   Diagnosis Date    Bipolar 2 disorder (HCC)     Body mass index (BMI)40.0-44.9, adult 8/16/2021    Diabetes (HCC)     Sleep apnea      No past surgical history on file.      PAST PSYCHIATRIC MEDICATIONS  Prozac (longest), Zoloft, Lexapro  No SNRI or Wellbutrin trial  Viibryd, Vortioxetine  Lamictal  Abilify, Rexulti  Ambien, Trazodone  Xanax       MEDICAL REVIEW OF SYSTEMS:   Constitutional negative   Eyes negative   Ears/Nose/Mouth/Throat negative   Cardiovascular negative   Respiratory  RIK   Gastrointestinal negative   Genitourinary negative   Muscular negative   Integumentary negative   Neurological negative   Endocrine  DM Type 2   Hematologic/Lymphatic negative     PHYSICAL EXAMINAION:  Vital signs: There were no vitals taken for this visit.  Musculoskeletal: Normal gait.   Abnormal movements: none      MENTAL STATUS EXAMINATION      General:   - Grooming and hygiene: Casual,   - Apparent distress: none,   - Behavior: Calm  - Eye Contact:  Good,   - no psychomotor agitation or retardation    - Participation: Active verbal participation  Orientation: Alert and Fully Oriented to person, place and time  Mood: Depressed  Affect: Constricted,  Thought Process: Logical and Goal-directed  Thought Content: Denies suicidal or homicidal ideations, intent or plan   Perception: Denies auditory or visual hallucinations. No delusions noted   Attention span and concentration: Intact   Speech:Rate within normal limits and Volume within normal limits  Language: Appropriate   Insight: Good  Judgment: Good  Recent and  remote memory: No gross evidence of memory deficits        DEPRESSION SCREENIN/4/2022    11:59 AM 3/1/2023     8:20 AM 2023     2:00 PM   Depression Screen (PHQ-2/PHQ-9)   PHQ-2 Total Score 0     PHQ-2 Total Score  0 5   PHQ-9 Total Score 0     PHQ-9 Total Score   23       Interpretation of PHQ-9 Total Score   Score Severity   1-4 No Depression   5-9 Mild Depression   10-14 Moderate Depression   15-19 Moderately Severe Depression   20-27 Severe Depression    CURRENT RISK:       Suicidal: Low       Homicidal: Low       Self-Harm: Low       Relapse: Low       Crisis Safety Plan Reviewed Not Indicated       If evidence of imminent risk is present, intervention/plan:      MEDICAL RECORDS/LABS/DIAGNOSTIC TESTS REVIEWED:  No new lab since last visit     [place]  records -   Reviewed     PLAN:  (1) MDD, severe with chronic passive death wishes; h/o bipolar disorder (2) ZULMA; (3) Cannabis use  Persistent depression  Continue Lexapro 20 mg daily for mood and anxiety.  Continue Lamictal 100 mg AM- 200 mg HS for mood stabilization.  Continue Abilify 5 mg daily for mood stabilization.  Patient not interested in psychotherapy.  Medication options, alternatives (including no medications) and medication risks/benefits/side effects were discussed in detail.  The patient was advised to call, message provider on [employer], or come in to the clinic if symptoms worsen or if any future questions/issues regarding their medications arise; the patient verbalized understanding and agreement.    The patient was educated to call 911, call the suicide hotline, or go to local ER if having thoughts of suicide or homicide; verbalized understanding.    Billing Coding based on:  46827 based on MDM    Patient will find a new psychiatrist taking his new insurance ([employer])    The proposed treatment plan was discussed with the patient who was provided the opportunity to ask questions and make suggestions regarding alternative  treatment. Patient verbalized understanding and expressed agreement with the plan.       Alden James M.D.  11/21/23    This note was created using voice recognition software (Dragon). The accuracy of the dictation is limited by the abilities of the software. I have reviewed the note prior to signing, however some errors in grammar and context are still possible. If you have any questions related to this note please do not hesitate to contact our office.

## 2023-11-22 DIAGNOSIS — F33.2 SEVERE EPISODE OF RECURRENT MAJOR DEPRESSIVE DISORDER, WITHOUT PSYCHOTIC FEATURES (HCC): ICD-10-CM

## 2023-11-22 RX ORDER — LAMOTRIGINE 100 MG/1
TABLET ORAL
Qty: 270 TABLET | Refills: 2 | Status: SHIPPED | OUTPATIENT
Start: 2023-11-22

## 2024-03-21 DIAGNOSIS — E11.9 TYPE 2 DIABETES MELLITUS WITHOUT COMPLICATION, WITHOUT LONG-TERM CURRENT USE OF INSULIN (HCC): ICD-10-CM

## 2024-03-22 NOTE — TELEPHONE ENCOUNTER
Received request via: Pharmacy    Was the patient seen in the last year in this department? Yes    Does the patient have an active prescription (recently filled or refills available) for medication(s) requested? No    Pharmacy Name: Pharmacy:   [facility] [id number] - [place], [place] - [address]      Does the patient have longterm Plus and need 100 day supply (blood pressure, diabetes and cholesterol meds only)? Patient does not have SCP

## 2024-07-09 ENCOUNTER — HOSPITAL ENCOUNTER (OUTPATIENT)
Facility: MEDICAL CENTER | Age: 39
End: 2024-07-09
Attending: NURSE PRACTITIONER
Payer: COMMERCIAL

## 2024-07-09 ENCOUNTER — OFFICE VISIT (OUTPATIENT)
Dept: MEDICAL GROUP | Facility: PHYSICIAN GROUP | Age: 39
End: 2024-07-09
Payer: COMMERCIAL

## 2024-07-09 VITALS
BODY MASS INDEX: 38.98 KG/M2 | SYSTOLIC BLOOD PRESSURE: 132 MMHG | HEART RATE: 111 BPM | WEIGHT: 263.2 LBS | HEIGHT: 69 IN | DIASTOLIC BLOOD PRESSURE: 82 MMHG | TEMPERATURE: 96.6 F | OXYGEN SATURATION: 95 % | RESPIRATION RATE: 20 BRPM

## 2024-07-09 DIAGNOSIS — K76.0 FATTY LIVER: ICD-10-CM

## 2024-07-09 DIAGNOSIS — E11.9 TYPE 2 DIABETES MELLITUS WITHOUT COMPLICATION, WITHOUT LONG-TERM CURRENT USE OF INSULIN (HCC): ICD-10-CM

## 2024-07-09 DIAGNOSIS — R53.83 OTHER FATIGUE: ICD-10-CM

## 2024-07-09 DIAGNOSIS — E11.65 TYPE 2 DIABETES MELLITUS WITH HYPERGLYCEMIA, WITHOUT LONG-TERM CURRENT USE OF INSULIN (HCC): ICD-10-CM

## 2024-07-09 DIAGNOSIS — E55.9 VITAMIN D DEFICIENCY: ICD-10-CM

## 2024-07-09 DIAGNOSIS — E78.5 DYSLIPIDEMIA: ICD-10-CM

## 2024-07-09 LAB
HBA1C MFR BLD: 11.2 % (ref ?–5.8)
POCT INT CON NEG: NEGATIVE
POCT INT CON POS: POSITIVE

## 2024-07-09 PROCEDURE — 3079F DIAST BP 80-89 MM HG: CPT | Performed by: NURSE PRACTITIONER

## 2024-07-09 PROCEDURE — 99214 OFFICE O/P EST MOD 30 MIN: CPT | Performed by: NURSE PRACTITIONER

## 2024-07-09 PROCEDURE — 3075F SYST BP GE 130 - 139MM HG: CPT | Performed by: NURSE PRACTITIONER

## 2024-07-09 PROCEDURE — 82043 UR ALBUMIN QUANTITATIVE: CPT

## 2024-07-09 PROCEDURE — 82570 ASSAY OF URINE CREATININE: CPT

## 2024-07-09 PROCEDURE — 83036 HEMOGLOBIN GLYCOSYLATED A1C: CPT | Performed by: NURSE PRACTITIONER

## 2024-07-09 RX ORDER — BLOOD-GLUCOSE SENSOR
1 EACH MISCELLANEOUS
Qty: 6 EACH | Refills: 3 | Status: SHIPPED | OUTPATIENT
Start: 2024-07-09

## 2024-07-09 RX ORDER — ATORVASTATIN CALCIUM 10 MG/1
10 TABLET, FILM COATED ORAL DAILY
Qty: 90 TABLET | Refills: 0 | Status: SHIPPED | OUTPATIENT
Start: 2024-07-09

## 2024-07-09 ASSESSMENT — ENCOUNTER SYMPTOMS
BLURRED VISION: 1
FEVER: 0
INSOMNIA: 0
VOMITING: 0
CHILLS: 0
DIZZINESS: 0
DEPRESSION: 0
WEIGHT LOSS: 0
POLYDIPSIA: 1
MUSCULOSKELETAL NEGATIVE: 1
DIARRHEA: 0
PALPITATIONS: 0
PHOTOPHOBIA: 0
HEADACHES: 0
SHORTNESS OF BREATH: 0
CONSTIPATION: 0
DOUBLE VISION: 0
NAUSEA: 1
ABDOMINAL PAIN: 0

## 2024-07-09 ASSESSMENT — PATIENT HEALTH QUESTIONNAIRE - PHQ9
8. MOVING OR SPEAKING SO SLOWLY THAT OTHER PEOPLE COULD HAVE NOTICED. OR THE OPPOSITE, BEING SO FIGETY OR RESTLESS THAT YOU HAVE BEEN MOVING AROUND A LOT MORE THAN USUAL: NOT AT ALL
1. LITTLE INTEREST OR PLEASURE IN DOING THINGS: NOT AT ALL
3. TROUBLE FALLING OR STAYING ASLEEP OR SLEEPING TOO MUCH: NEARLY EVERY DAY
SUM OF ALL RESPONSES TO PHQ QUESTIONS 1-9: 7
9. THOUGHTS THAT YOU WOULD BE BETTER OFF DEAD, OR OF HURTING YOURSELF: NOT AT ALL
7. TROUBLE CONCENTRATING ON THINGS, SUCH AS READING THE NEWSPAPER OR WATCHING TELEVISION: SEVERAL DAYS
4. FEELING TIRED OR HAVING LITTLE ENERGY: NEARLY EVERY DAY
2. FEELING DOWN, DEPRESSED, IRRITABLE, OR HOPELESS: NOT AT ALL
6. FEELING BAD ABOUT YOURSELF - OR THAT YOU ARE A FAILURE OR HAVE LET YOURSELF OR YOUR FAMILY DOWN: NOT AL ALL
SUM OF ALL RESPONSES TO PHQ9 QUESTIONS 1 AND 2: 0
5. POOR APPETITE OR OVEREATING: NOT AT ALL

## 2024-07-10 LAB
CREAT UR-MCNC: 105.68 MG/DL
MICROALBUMIN UR-MCNC: 194.2 MG/DL
MICROALBUMIN/CREAT UR: 1838 MG/G (ref 0–30)

## 2024-08-08 ENCOUNTER — APPOINTMENT (OUTPATIENT)
Dept: MEDICAL GROUP | Facility: PHYSICIAN GROUP | Age: 39
End: 2024-08-08
Payer: COMMERCIAL

## 2024-08-08 VITALS
BODY MASS INDEX: 38.66 KG/M2 | OXYGEN SATURATION: 96 % | DIASTOLIC BLOOD PRESSURE: 68 MMHG | RESPIRATION RATE: 18 BRPM | HEIGHT: 69 IN | SYSTOLIC BLOOD PRESSURE: 110 MMHG | WEIGHT: 261 LBS | TEMPERATURE: 98.1 F | HEART RATE: 81 BPM

## 2024-08-08 DIAGNOSIS — G47.00 INSOMNIA, UNSPECIFIED TYPE: ICD-10-CM

## 2024-08-08 DIAGNOSIS — E78.5 DYSLIPIDEMIA: ICD-10-CM

## 2024-08-08 DIAGNOSIS — E66.9 OBESITY (BMI 30-39.9): ICD-10-CM

## 2024-08-08 DIAGNOSIS — E11.9 TYPE 2 DIABETES MELLITUS WITHOUT COMPLICATION, WITHOUT LONG-TERM CURRENT USE OF INSULIN (HCC): ICD-10-CM

## 2024-08-08 LAB — RETINAL SCREEN: NEGATIVE

## 2024-08-08 PROCEDURE — 3074F SYST BP LT 130 MM HG: CPT | Performed by: NURSE PRACTITIONER

## 2024-08-08 PROCEDURE — 3078F DIAST BP <80 MM HG: CPT | Performed by: NURSE PRACTITIONER

## 2024-08-08 PROCEDURE — 99214 OFFICE O/P EST MOD 30 MIN: CPT | Performed by: NURSE PRACTITIONER

## 2024-08-08 RX ORDER — VILAZODONE HYDROCHLORIDE 40 MG/1
TABLET ORAL
COMMUNITY

## 2024-08-08 RX ORDER — LIOTHYRONINE SODIUM 10 UG/ML
INJECTION, SOLUTION INTRAVENOUS
COMMUNITY

## 2024-08-08 ASSESSMENT — ENCOUNTER SYMPTOMS
MYALGIAS: 0
DIARRHEA: 0
CONSTIPATION: 0
BLOOD IN STOOL: 0
COUGH: 0
INSOMNIA: 1
NAUSEA: 0
CLAUDICATION: 0
DOUBLE VISION: 0
DIZZINESS: 0
VOMITING: 0
HEMOPTYSIS: 0
HEARTBURN: 0
PALPITATIONS: 0
BLURRED VISION: 1
DIAPHORESIS: 0
CHILLS: 0
FEVER: 0
WEAKNESS: 0
BACK PAIN: 0
WHEEZING: 0
POLYDIPSIA: 0
ORTHOPNEA: 0
WEIGHT LOSS: 0
HEADACHES: 0
SPUTUM PRODUCTION: 0
SHORTNESS OF BREATH: 0
FLANK PAIN: 0
ABDOMINAL PAIN: 0
PHOTOPHOBIA: 0

## 2024-08-08 NOTE — PROGRESS NOTES
Chief Complaint   Patient presents with    Lab Results       HISTORY OF PRESENT ILLNESS: Patient is a pleasant 39 y.o. male who presents to the office today to follow up regarding his lab results and recent resumption of atorvastatin 10 mg tablets daily and initiation of Tirzepatide for DM Type 2 for an elevated Hgb A1c of 11.2% on 7/8/24    Diabetes Type 2:   Chronic and stable condition  Fasting CMP showed an elevated blood glucose of 369 on 7/11/24. States he was only taking metformin for 2 days prior to blood work.   Taking metformin 1000 mg BID  Taking Tirzepatide 2.5 mg/0.5 weekly injection.  Reports no side effects or adverse reactions to medications  Notes a decrease in blurred vision, polyuria, and polydipsia. States he is urinating 4-5 times per day and once at night  Has no change in bowel pattern, reflux, abdominal discomfort, fatigue, or lightheadedness  Is not monitoring BG at home due to continuous glucometer being too expensive and verbalizing inability to purchase glucometer OTC due to cost.   Reports discontinuation of energy drinking, soda, processed foods, and is eating out less. Eating lean protein such as chicken, turkey sandwiches on oat wheat bread, an low sugar protein shakes in the morning.   Reports no increase in physical activity due to fatigue, insomnia, and fluctuating work schedule.     Dyslipidemia:   Resumed atorvastatin 10 mg tablet daily on 7/9/2024 after not taking for several months  Has increased intake of whole grains, lean protein, and cut out sweetened beverages and is taking statin as prescribed.  Reports no abd pain, joint pain, or myalgias.   Labs on 7/11/2024 were as follows:  Total Cholesterol 247  Triglycerides 179    HDL 36    Insomnia:   Reports long-standing hx of insomnia  Difficulty falling asleep and staying asleep intermittently  Related insomnia to fluctuating work schedule, long shifts that end late at night and begin early again in the  "morning.  Reports he goes home from work, eats, and goes straight to bed. No screens prior. No additional relaxing routine due to getting home late and short sleep window.  Has used Trazadone in the past  mg PRN and this caused \"severe hangover\" regardless of dose  Has tried melatonin and other OTC sleep aids with no improvement.  Has been prescribed Ambien 10 mg for sleep by previous provided. States this works well for him, as he does not experience fatigue or drowsiness the next day. States he would use Ambien once a week and only as needed. Never had any medication related amnesia or other complications. States he would often be left with  pills because he would not use regularly.     Patient Active Problem List    Diagnosis Date Noted    Severe episode of recurrent major depressive disorder, without psychotic features (HCC) 2023    ZULMA (generalized anxiety disorder) 2023    Weight loss, unintentional 2023    Bilateral hand numbness 2023    Microalbuminuria 2023    Obesity (BMI 30-39.9) 2023    Obesity due to excess calories with serious comorbidity 2023    Obstructive sleep apnea syndrome 2020    Chronic fatigue 2020    Elevated ALT measurement 2020    Vitamin D deficiency 2020    Type 2 diabetes mellitus without complication, without long-term current use of insulin (HCC) 2020    Dyslipidemia 2020    Class 2 obesity due to excess calories without serious comorbidity with body mass index (BMI) of 38.0 to 38.9 in adult 2020    Fatty liver 2020       Allergies:Patient has no known allergies.    Current Outpatient Medications Ordered in Epic   Medication Sig Dispense Refill    Liothyronine Sodium 10 MCG/ML Solution intravenously      vilazodone (VIIBRYD) 40 MG tablet orally      Tirzepatide 5 MG/0.5ML Solution Pen-injector Inject 5 mg under the skin every 7 days. 6 mL 0    atorvastatin (LIPITOR) 10 MG Tab Take 1 " Tablet by mouth every day. 90 Tablet 0    metFORMIN (GLUCOPHAGE) 500 MG Tab Take 2 Tablets by mouth 2 times a day with meals. 360 Tablet 3    Continuous Glucose Sensor (FREESTYLE [place] 3 SENSOR) [facility] 1 Application every 14 days. 6 Each 3     No current Epic-ordered facility-administered medications on file.       Past Medical History:   Diagnosis Date    Bipolar 2 disorder (HCC)     Body mass index (BMI)40.0-44.9, adult 2021    Diabetes (HCC)     Sleep apnea        Social History     Tobacco Use    Smoking status: Former     Current packs/day: 0.00     Average packs/day: 1 pack/day for 15.0 years (15.0 ttl pk-yrs)     Types: Cigarettes     Start date: 1999     Quit date: 2014     Years since quitting: 10.1    Smokeless tobacco: Never   Vaping Use    Vaping status: Never Used   Substance Use Topics    Alcohol use: Not Currently    Drug use: Never       Family Status   Relation Name Status    Mo  Alive    Fa biological Other        unknown    Sis half sisters Alive    Sis 1/2 Alive    Bro /2 Alive    MAunt  Alive    MUnc  Alive    MGMo      MGFa      Asad  Alive     Family History   Problem Relation Age of Onset    No Known Problems Mother     Heart Disease Father     Diabetes Father     No Known Problems Sister     No Known Problems Sister     No Known Problems Brother     Multiple Sclerosis Maternal Aunt     Multiple Sclerosis Maternal Uncle     Cancer Maternal Grandmother         uterine    Diabetes Maternal Grandmother     Atrial fibrillation Maternal Grandmother     Heart Attack Maternal Grandfather 60    Pancreatic Cancer Maternal Grandfather        Review of Systems   Constitutional:  Positive for malaise/fatigue. Negative for chills, diaphoresis, fever and weight loss.   Eyes:  Positive for blurred vision. Negative for double vision and photophobia.   Respiratory:  Negative for cough, hemoptysis, sputum production, shortness of breath and wheezing.    Cardiovascular:  Negative for  "chest pain, palpitations, orthopnea, claudication and leg swelling.   Gastrointestinal:  Negative for abdominal pain, blood in stool, constipation, diarrhea, heartburn, melena, nausea and vomiting.   Genitourinary:  Negative for dysuria, flank pain, frequency, hematuria and urgency.   Musculoskeletal:  Negative for back pain, joint pain and myalgias.   Skin:  Negative for itching and rash.   Neurological:  Negative for dizziness, weakness and headaches.   Endo/Heme/Allergies:  Negative for polydipsia.        Negative for polyuria  Negative for polyphagia    Psychiatric/Behavioral:  The patient has insomnia.      Objective  /68   Pulse 81   Temp 36.7 °C (98.1 °F) (Temporal)   Resp 18   Ht 1.753 m (5' 9\")   Wt 118 kg (261 lb)   SpO2 96%     Physical Exam  Vitals reviewed.   Constitutional:       General: He is not in acute distress.     Appearance: Normal appearance. He is obese. He is not ill-appearing, toxic-appearing or diaphoretic.   Eyes:      General: No scleral icterus.     Conjunctiva/sclera: Conjunctivae normal.      Pupils: Pupils are equal, round, and reactive to light.   Cardiovascular:      Rate and Rhythm: Normal rate and regular rhythm.      Pulses: Normal pulses.      Heart sounds: Normal heart sounds. No murmur heard.     No friction rub. No gallop.   Pulmonary:      Effort: Pulmonary effort is normal. No respiratory distress.      Breath sounds: Normal breath sounds. No wheezing, rhonchi or rales.   Neurological:      General: No focal deficit present.      Mental Status: He is alert and oriented to person, place, and time.   Psychiatric:         Attention and Perception: Attention and perception normal.         Mood and Affect: Affect is blunt.         Speech: Speech normal.         Behavior: Behavior is uncooperative and agitated.         Thought Content: Thought content normal.         Cognition and Memory: Cognition and memory normal.         Judgment: Judgment normal. "       Assessment/Plan:    1. Obesity (BMI 30-39.9)  Chronic and stable condition  Continue to make dietary changes. Increased fiber, vegetables, whole fruit, and whole grains  Continue to avoid energy drinks, soda, added sugar, white breads, noodles, and rice.   Attempt to add 20 minutes brisk walking per day with a goal of 150 minutes per week for cardiovascular health and management of hyperglycemia and hyperlipidemia.    2. Type 2 diabetes mellitus without complication, without long-term current use of insulin (HCC)  Chronic and stable condition  - Tirzepatide 5 MG/0.5ML Solution Pen-injector; Inject 5 mg under the skin every 7 days.  Dispense: 6 mL; Refill: 0  Fasting BG on 7/9/2024 was 369  States he was only taking metformin for 2 days prior to blood draw  Started Tirzepatide 2.5mg/0.5 mL on 7/9/24  We will increase Tirzepatide to 5 mg injection once per week (Thursdays). This will begin next Thursday after today's 4th dose as reported by pt.     3. Insomnia, unspecified type  Chronic and stable condition  Discussed sleep hygiene and avoidance of screens prior to bed.   PDMP reviewed  Discussed use of 5 mg ambien to start vs 10 as he has not taken ambien for 2 years.   Educated on controlled substances and need for pain management screen and contract yearly as well as every 3 months needs a visit in person or virtual for refills.  Patient felt this was too much effort and unreasonable to obtain a prescription for Ambien at this tiem.    4. Dyslipidemia  Continue atorvastatin 10 mg daily  Repeat lipid panel in 2 months  Continue to increase intake of fish, lean protein, vegetables, low fat dairy, fiber, and whole grains  Follow up in 2 months     Follow up in 2 months for 3 month Hgb A1c and medication evaluation.  Will order repeat CMP, and lipid panel at that time for reevaluation.    Supportive care, differential diagnoses, and indications for immediate follow-up discussed with patient.   Pathogenesis of  diagnosis discussed including typical length and natural progression.   Instructed to return to clinic or nearest emergency department for any change in condition, further concerns, or worsening of symptoms.  Patient states understanding of the plan of care and discharge instructions.    Please note that this dictation was created using voice recognition software. I have made every reasonable attempt to correct obvious errors, but I expect that there are errors of grammar and possibly content that I did not discover before finalizing the note.      Angi Kirby, Student FNP

## 2024-09-18 ENCOUNTER — TELEPHONE (OUTPATIENT)
Dept: HEALTH INFORMATION MANAGEMENT | Facility: OTHER | Age: 39
End: 2024-09-18
Payer: COMMERCIAL

## 2024-10-11 DIAGNOSIS — E11.65 TYPE 2 DIABETES MELLITUS WITH HYPERGLYCEMIA, WITHOUT LONG-TERM CURRENT USE OF INSULIN (HCC): ICD-10-CM

## 2024-10-11 DIAGNOSIS — E11.9 TYPE 2 DIABETES MELLITUS WITHOUT COMPLICATION, WITHOUT LONG-TERM CURRENT USE OF INSULIN (HCC): ICD-10-CM

## 2024-10-11 DIAGNOSIS — E78.5 DYSLIPIDEMIA: ICD-10-CM

## 2024-10-15 RX ORDER — ATORVASTATIN CALCIUM 10 MG/1
10 TABLET, FILM COATED ORAL DAILY
Qty: 90 TABLET | Refills: 0 | Status: SHIPPED | OUTPATIENT
Start: 2024-10-15

## 2024-10-31 DIAGNOSIS — E11.9 TYPE 2 DIABETES MELLITUS WITHOUT COMPLICATION, WITHOUT LONG-TERM CURRENT USE OF INSULIN (HCC): ICD-10-CM

## 2024-11-21 ENCOUNTER — OFFICE VISIT (OUTPATIENT)
Dept: MEDICAL GROUP | Facility: PHYSICIAN GROUP | Age: 39
End: 2024-11-21
Payer: COMMERCIAL

## 2024-11-21 VITALS
SYSTOLIC BLOOD PRESSURE: 100 MMHG | HEART RATE: 100 BPM | DIASTOLIC BLOOD PRESSURE: 76 MMHG | OXYGEN SATURATION: 95 % | BODY MASS INDEX: 35.7 KG/M2 | RESPIRATION RATE: 16 BRPM | WEIGHT: 241 LBS | HEIGHT: 69 IN | TEMPERATURE: 96.8 F

## 2024-11-21 DIAGNOSIS — G47.33 OBSTRUCTIVE SLEEP APNEA SYNDROME: ICD-10-CM

## 2024-11-21 DIAGNOSIS — E11.9 TYPE 2 DIABETES MELLITUS WITHOUT COMPLICATION, WITHOUT LONG-TERM CURRENT USE OF INSULIN (HCC): ICD-10-CM

## 2024-11-21 DIAGNOSIS — E66.09 CLASS 2 OBESITY DUE TO EXCESS CALORIES WITHOUT SERIOUS COMORBIDITY WITH BODY MASS INDEX (BMI) OF 38.0 TO 38.9 IN ADULT: ICD-10-CM

## 2024-11-21 DIAGNOSIS — E78.5 DYSLIPIDEMIA: ICD-10-CM

## 2024-11-21 DIAGNOSIS — F33.2 SEVERE EPISODE OF RECURRENT MAJOR DEPRESSIVE DISORDER, WITHOUT PSYCHOTIC FEATURES (HCC): ICD-10-CM

## 2024-11-21 DIAGNOSIS — E66.812 CLASS 2 OBESITY DUE TO EXCESS CALORIES WITHOUT SERIOUS COMORBIDITY WITH BODY MASS INDEX (BMI) OF 38.0 TO 38.9 IN ADULT: ICD-10-CM

## 2024-11-21 DIAGNOSIS — E66.01 SEVERE OBESITY (HCC): ICD-10-CM

## 2024-11-21 LAB
HBA1C MFR BLD: 6 % (ref ?–5.8)
POCT INT CON NEG: NEGATIVE
POCT INT CON POS: POSITIVE

## 2024-11-21 PROCEDURE — 99214 OFFICE O/P EST MOD 30 MIN: CPT | Performed by: NURSE PRACTITIONER

## 2024-11-21 PROCEDURE — 83036 HEMOGLOBIN GLYCOSYLATED A1C: CPT | Performed by: NURSE PRACTITIONER

## 2024-11-21 PROCEDURE — 3074F SYST BP LT 130 MM HG: CPT | Performed by: NURSE PRACTITIONER

## 2024-11-21 PROCEDURE — 3078F DIAST BP <80 MM HG: CPT | Performed by: NURSE PRACTITIONER

## 2024-11-21 ASSESSMENT — ENCOUNTER SYMPTOMS
BLURRED VISION: 0
VOMITING: 0
SHORTNESS OF BREATH: 0
PALPITATIONS: 0
HEADACHES: 0
POLYDIPSIA: 0
INSOMNIA: 1
FEVER: 0
CHILLS: 0
NAUSEA: 0
ABDOMINAL PAIN: 0
DIZZINESS: 0
DIARRHEA: 0
CONSTIPATION: 0
WEIGHT LOSS: 0

## 2024-11-21 NOTE — PROGRESS NOTES
Verbal consent was acquired by the patient to use [employer] ambient listening note generation during this visit     CC:  Chief Complaint   Patient presents with    Diabetes       Leroy Naylor 39 y.o. male  patient presenting for     History of Present Illness  The patient is a 39-year-old male who presents today for follow-up on his blood sugars and diabetes.    Problem   Severe Obesity (Hcc)   Body Mass Index (Bmi) of 35.0-35.9 in Adult   Obstructive Sleep Apnea Syndrome      Chronic condition     Diagnosed [id number]    Uses CPAP daily     Referrals requested     Dyslipidemia    Continue to take 10mg atorvastatin daily   New labs ordered for possible dose change    Takes atorvastatin 10mg daily     History of DMT2     Class 2 Obesity Due to Excess Calories Without Serious Comorbidity With Body Mass Index (Bmi) of 38.0 to 38.9 in Adult    Chronic and stable condition.  Currently controlled with metformin 1000mg BID  and mounjaro 5mg , lifestyle, diet and exercise.   Retinal exam completed: 7/2024  LDL: 178  ACEi/ARB? none  Statin? Atorvastatin 10 mg  Urine Albumin/creatinine: completed 7/2024  Tobacco use: no   BP control: 100/76  Concomitant HTN? none  A1C 6.0 % 11/21/2024    A1C due: March 2025  Last monofilament exam- completed 7/2024  Patient tolerates medications well with no significant or bothersome side effects.   Denies polyuria, polydipsia, polyphagia.     20lb weight loss since 8/2024        He is currently managing his diabetes with metformin 1000 mg twice daily and Mounjaro 5 mg once weekly, without any concerns regarding the dosage. He has not been using the FreeStyle Kushal due to financial constraints and does not have a fingerstick machine. His appetite has decreased, and he feels satiated more quickly. His diet includes turkey sandwiches, soups, protein shakes, cheese, eggs, and frozen dinners. He has significantly reduced his intake of energy drinks, from 1 to 2 per day to none, and now  "consumes diet coke and black coffee. He reports feeling much better than he did in July 2024, when he was quite ill. He has lost approximately 20 pounds but has not been closely monitoring his weight. He is not experiencing increased urination, thirst, blurred vision, nausea, vomiting, abdominal pain, diarrhea, or constipation.        Review of Systems   Constitutional:  Negative for chills, fever, malaise/fatigue and weight loss.   Eyes:  Negative for blurred vision.   Respiratory:  Negative for shortness of breath.    Cardiovascular:  Negative for chest pain and palpitations.   Gastrointestinal:  Negative for abdominal pain, constipation, diarrhea, nausea and vomiting.   Neurological:  Negative for dizziness and headaches.   Endo/Heme/Allergies:  Negative for polydipsia.   Psychiatric/Behavioral:  The patient has insomnia.        /76   Pulse 100   Temp 36 °C (96.8 °F) (Temporal)   Resp 16   Ht 1.753 m (5' 9\")   Wt 109 kg (241 lb)   SpO2 95% , Body mass index is 35.59 kg/m².    Physical Exam  Constitutional:       General: He is not in acute distress.     Appearance: Normal appearance. He is not ill-appearing.   HENT:      Head: Normocephalic and atraumatic.   Pulmonary:      Effort: Pulmonary effort is normal.   Neurological:      Mental Status: He is alert and oriented to person, place, and time.   Psychiatric:         Mood and Affect: Mood normal.         Behavior: Behavior normal.         Thought Content: Thought content normal.         Judgment: Judgment normal.           Results  Laboratory Studies 11/21/2024  Hemoglobin A1c was 6.0.       Assessment and Plan    Assessment & Plan  1. Diabetes Mellitus.  His hemoglobin A1c has improved from 11.2 in July 2024 to 6.0 currently. He is currently taking metformin 1000 mg twice a day and Mounjaro 5 mg once weekly. A refill for Mounjaro 6 mg with 3 refills will be provided. He is advised to continue his current diet and medication regimen. A fasting lipid " profile and complete metabolic panel (CMP) will be ordered. The atorvastatin dose may be increased based on the lipid profile results. A hemoglobin A1c test will be conducted in March 2025. He is advised to monitor his blood pressure at the pharmacy and maintain adequate hydration.    2. Sleep Apnea.  He reports ongoing issues with sleep, including severe sleep apnea and episodes of insomnia. He continues to use CPAP therapy.    3. Health Maintenance.  The influenza vaccine will be administered today.    Follow-up  Return in March 2025 for follow up.       1. Obstructive sleep apnea syndrome  Chronic and stable condition     2. Type 2 diabetes mellitus without complication, without long-term current use of insulin (HCC)  Chronic and stable condition   - POCT Hemoglobin A1C  - Lipid Profile; Future  - Tirzepatide 5 MG/0.5ML Solution Auto-injector; Inject 5 mg under the skin every 7 days.  Dispense: 6 mL; Refill: 3    3. Class 2 obesity due to excess calories without serious comorbidity with body mass index (BMI) of 38.0 to 38.9 in adult  Chronic and stable condition     4. Dyslipidemia  Chronic and stable condition     5. Severe obesity (HCC)  Chronic and stable condition     6. Severe episode of recurrent major depressive disorder, without psychotic features (HCC)  Chronic and stable condition     7. Body mass index (BMI) of 35.0-35.9 in adult  Chronic and stable condition        HCC Gap Form    Diagnosis: E66.01 - Severe obesity (HCC)  Z68.35 - Body mass index (BMI) of 35.0-35.9 in adult  Comorbidity Diagnosis: Severe episode of recurrent major depressive disorder, without psychotic features (HCC)  The current BMI is 35.59 kg/m² as of 11/21/24.    Past BMI Values:  11/21/24 : 35.59 kg/m². 08/08/24 : 38.54 kg/m². 07/09/24 : 38.87 kg/m².   Assessment and plan: Chronic, improving. Encouraged healthy diet and physical activity changes with a goal of weight loss. Follow up at least annually. The comorbid condition is  improving based on today's assessment. Continue current treatment plan including control of risk factors. Follow up in 4 months.    Last edited 11/21/24 08:35 PST by GLADIS Zamorano.         Discussed with patient possible alternative diagnoses, patient is to take all medications as prescribed.     If symptoms persist FU w/PCP, if symptoms worsen go to emergency room.     If experiencing any side effects from prescribed medications reports to the office immediately or go to emergency room.    Reviewed indication, dosage, usage and potential adverse effects of prescribed medications.     Reviewed risks and benefits of treatment plan. Patient verbalizes understanding of all instruction and verbally agrees to plan.    Return in about 4 months (around 3/21/2025) for DM A1c follow up.    This note was created using voice recognition software ([employer]). The accuracy of the dictation is limited by the abilities of the software. I have reviewed the note prior to signing, however some errors in grammar and context are still possible. If you have any questions related to this note please do not hesitate to contact our office.

## 2024-11-27 ENCOUNTER — TELEPHONE (OUTPATIENT)
Dept: HEALTH INFORMATION MANAGEMENT | Facility: OTHER | Age: 39
End: 2024-11-27
Payer: COMMERCIAL

## 2025-01-12 DIAGNOSIS — E78.5 DYSLIPIDEMIA: ICD-10-CM

## 2025-01-12 DIAGNOSIS — E11.9 TYPE 2 DIABETES MELLITUS WITHOUT COMPLICATION, WITHOUT LONG-TERM CURRENT USE OF INSULIN (HCC): ICD-10-CM

## 2025-01-12 DIAGNOSIS — E11.65 TYPE 2 DIABETES MELLITUS WITH HYPERGLYCEMIA, WITHOUT LONG-TERM CURRENT USE OF INSULIN (HCC): ICD-10-CM

## 2025-01-14 RX ORDER — ATORVASTATIN CALCIUM 10 MG/1
10 TABLET, FILM COATED ORAL DAILY
Qty: 90 TABLET | Refills: 0 | Status: SHIPPED | OUTPATIENT
Start: 2025-01-14

## 2025-03-10 ENCOUNTER — OFFICE VISIT (OUTPATIENT)
Dept: URGENT CARE | Facility: CLINIC | Age: 40
End: 2025-03-10
Payer: COMMERCIAL

## 2025-03-10 VITALS
TEMPERATURE: 97.3 F | RESPIRATION RATE: 14 BRPM | SYSTOLIC BLOOD PRESSURE: 112 MMHG | OXYGEN SATURATION: 94 % | HEART RATE: 106 BPM | HEIGHT: 69 IN | WEIGHT: 239 LBS | BODY MASS INDEX: 35.4 KG/M2 | DIASTOLIC BLOOD PRESSURE: 76 MMHG

## 2025-03-10 DIAGNOSIS — L02.411 ABSCESS OF AXILLA, RIGHT: ICD-10-CM

## 2025-03-10 PROCEDURE — 3074F SYST BP LT 130 MM HG: CPT | Performed by: NURSE PRACTITIONER

## 2025-03-10 PROCEDURE — 10061 I&D ABSCESS COMP/MULTIPLE: CPT | Performed by: NURSE PRACTITIONER

## 2025-03-10 PROCEDURE — 3078F DIAST BP <80 MM HG: CPT | Performed by: NURSE PRACTITIONER

## 2025-03-10 RX ORDER — DOXYCYCLINE HYCLATE 100 MG
100 TABLET ORAL 2 TIMES DAILY
Qty: 14 TABLET | Refills: 0 | Status: SHIPPED | OUTPATIENT
Start: 2025-03-10 | End: 2025-03-17

## 2025-03-10 RX ORDER — IBUPROFEN 600 MG/1
600 TABLET, FILM COATED ORAL EVERY 6 HOURS PRN
Qty: 30 TABLET | Refills: 0 | Status: SHIPPED | OUTPATIENT
Start: 2025-03-10

## 2025-03-10 ASSESSMENT — ENCOUNTER SYMPTOMS
FEVER: 0
CHILLS: 0

## 2025-03-10 NOTE — LETTER
March 10, 2025         Patient: Leroy Naylor   YOB: 1985   Date of Visit: 3/10/2025           To Whom it May Concern:    Leroy Naylor was seen in my clinic on 3/10/2025. He may return to work on 3/12/2025. May return tomorrow if symptoms improve and patient is able to tolerate work duties.    If you have any questions or concerns, please don't hesitate to call.        Sincerely,           GLADIS Christiansen.  Electronically Signed

## 2025-03-10 NOTE — PROGRESS NOTES
Subjective:     Leroy Naylor is a 39 y.o. male who presents for Other (Under arm Ingrown hair (R) )      Presents for right axillary abscess x 5 days, with new development of a second one. States he tried draining the larger one with a sterile 22 needle, and got serosanguinous drainage. Taking Ibuprofen. Pain 7/10.         Other  This is a new problem. The current episode started in the past 7 days. The problem has been gradually worsening. Pertinent negatives include no chills or fever.       Past Medical History:   Diagnosis Date    Bipolar 2 disorder (HCC)     Body mass index (BMI)40.0-44.9, adult 8/16/2021    Diabetes (HCC)     Sleep apnea        No past surgical history on file.    Social History     Socioeconomic History    Marital status: Single     Spouse name: Not on file    Number of children: Not on file    Years of education: Not on file    Highest education level: Not on file   Occupational History    Occupation: [employer], [employer]   Tobacco Use    Smoking status: Former     Current packs/day: 0.00     Average packs/day: 1 pack/day for 15.0 years (15.0 ttl pk-yrs)     Types: Cigarettes     Start date: 6/8/1999     Quit date: 6/8/2014     Years since quitting: 10.7    Smokeless tobacco: Never   Vaping Use    Vaping status: Never Used   Substance and Sexual Activity    Alcohol use: Not Currently    Drug use: Never    Sexual activity: Yes     Partners: Female   Other Topics Concern    Not on file   Social History Narrative    Not on file     Social Drivers of Health     Financial Resource Strain: Not on file   Food Insecurity: Not on file   Transportation Needs: Not on file   Physical Activity: Not on file   Stress: Not on file   Social Connections: Not on file   Intimate Partner Violence: Not on file   Housing Stability: Not on file        Family History   Problem Relation Age of Onset    No Known Problems Mother     Heart Disease Father     Diabetes Father     No Known Problems  "Sister     No Known Problems Sister     No Known Problems Brother     Multiple Sclerosis Maternal Aunt     Multiple Sclerosis Maternal Uncle     Cancer Maternal Grandmother         uterine    Diabetes Maternal Grandmother     Atrial fibrillation Maternal Grandmother     Heart Attack Maternal Grandfather 60    Pancreatic Cancer Maternal Grandfather         No Known Allergies    Review of Systems   Constitutional:  Negative for chills and fever.   All other systems reviewed and are negative.       Objective:   /76   Pulse (!) 106   Temp 36.3 °C (97.3 °F) (Temporal)   Resp 14   Ht 1.753 m (5' 9\")   Wt 108 kg (239 lb)   SpO2 94%   BMI 35.29 kg/m²     Physical Exam  Vitals reviewed.   Pulmonary:      Effort: Pulmonary effort is normal.   Skin:     General: Skin is warm and dry.      Findings: Abscess and erythema present.      Comments: Right axilla.  2 x 4 cm abscess with surrounding erythema and fluctuance. Few small central pustules. 1 x 1 cm, firm and indurated mass adjacent to larger abscess. Guarded to palpation of the area.    Neurological:      Mental Status: He is alert and oriented to person, place, and time.         Assessment/Plan:   1. Abscess of axilla, right  - doxycycline (VIBRAMYCIN) 100 MG Tab; Take 1 Tablet by mouth 2 times a day for 7 days.  Dispense: 14 Tablet; Refill: 0  - ibuprofen (MOTRIN) 600 MG Tab; Take 1 Tablet by mouth every 6 hours as needed for Inflammation, Moderate Pain or Mild Pain.  Dispense: 30 Tablet; Refill: 0    -Daily dressing changes. Clean with soap and water. Monitor for signs of infection.  -Warm compress.  -OTC Tylenol or ibuprofen for pain.   -Can remove packing at 48 hours.   -Follow up in 48-72 hour for re-evaluation of wound or with any concerns; primary care provider or in clinic.    Follow up sooner for increasing signs of infection (redness, red streaking, accumulation of pus, pain, increased swelling, chills or fever).     Procedure: Incision and " Drainage  -Risks, benefits, and alternatives discussed. Risks include infection, bleeding, nerve damage, and poor cosmetic outcome  -Clean technique with sterile instruments  -Local anesthesia with 2% lidocaine  -Incision with #11 blade into fluctuant area with purulent material expressed  -Cavity probed and any loculations bluntly taken down with hemostat  -Packed with gauze  -Minimal bleeding with good hemostasis achieved  -The patient tolerated the procedure well    Oral Antibiotics:  ?Single abscess >=2 cm  ?Multiple lesions  ?Extensive surrounding cellulitis    Packing indicated:  ?Abscess in an immunocompromised or diabetic patient    I&D performed on abscess with fluctuance. Discussed using warm compresses and returning for possible I&D of the smaller one if it persists or develops fluctuance.     Differential diagnosis, natural history, supportive care, and indications for immediate follow-up discussed.

## 2025-03-10 NOTE — PATIENT INSTRUCTIONS
-Daily dressing changes. Clean with soap and water. Monitor for signs of infection.  -Warm compress.  -OTC Tylenol or ibuprofen for pain.   -Can remove packing at 48 hours.   -Follow up in 48-72 hour for re-evaluation of wound or with any concerns; primary care provider or in clinic.    Follow up sooner for increasing signs of infection (redness, red streaking, accumulation of pus, pain, increased swelling, chills or fever).

## 2025-03-12 ENCOUNTER — APPOINTMENT (OUTPATIENT)
Dept: MEDICAL GROUP | Facility: PHYSICIAN GROUP | Age: 40
End: 2025-03-12

## 2025-03-14 ENCOUNTER — TELEMEDICINE (OUTPATIENT)
Dept: BEHAVIORAL HEALTH | Facility: CLINIC | Age: 40
End: 2025-03-14
Payer: COMMERCIAL

## 2025-03-14 DIAGNOSIS — G47.09 OTHER INSOMNIA: ICD-10-CM

## 2025-03-14 DIAGNOSIS — F41.1 GAD (GENERALIZED ANXIETY DISORDER): ICD-10-CM

## 2025-03-14 DIAGNOSIS — F33.2 SEVERE EPISODE OF RECURRENT MAJOR DEPRESSIVE DISORDER, WITHOUT PSYCHOTIC FEATURES (HCC): ICD-10-CM

## 2025-03-14 PROCEDURE — 99214 OFFICE O/P EST MOD 30 MIN: CPT | Mod: 95 | Performed by: PSYCHIATRY & NEUROLOGY

## 2025-03-14 RX ORDER — LAMOTRIGINE 25 MG/1
TABLET ORAL
Qty: 45 TABLET | Refills: 0 | Status: SHIPPED | OUTPATIENT
Start: 2025-03-14 | End: 2025-04-13

## 2025-03-14 RX ORDER — DULOXETIN HYDROCHLORIDE 30 MG/1
30 CAPSULE, DELAYED RELEASE ORAL
Qty: 30 CAPSULE | Refills: 1 | Status: SHIPPED | OUTPATIENT
Start: 2025-03-14

## 2025-03-14 RX ORDER — QUETIAPINE FUMARATE 25 MG/1
TABLET, FILM COATED ORAL
Qty: 60 TABLET | Refills: 1 | Status: SHIPPED | OUTPATIENT
Start: 2025-03-14

## 2025-03-14 RX ORDER — HYDROXYZINE PAMOATE 25 MG/1
25 CAPSULE ORAL 2 TIMES DAILY PRN
Qty: 60 CAPSULE | Refills: 1 | Status: SHIPPED | OUTPATIENT
Start: 2025-03-14

## 2025-03-14 ASSESSMENT — ANXIETY QUESTIONNAIRES
7. FEELING AFRAID AS IF SOMETHING AWFUL MIGHT HAPPEN: NEARLY EVERY DAY
2. NOT BEING ABLE TO STOP OR CONTROL WORRYING: NEARLY EVERY DAY
5. BEING SO RESTLESS THAT IT IS HARD TO SIT STILL: SEVERAL DAYS
2. NOT BEING ABLE TO STOP OR CONTROL WORRYING: NEARLY EVERY DAY
3. WORRYING TOO MUCH ABOUT DIFFERENT THINGS: NEARLY EVERY DAY
5. BEING SO RESTLESS THAT IT IS HARD TO SIT STILL: SEVERAL DAYS
1. FEELING NERVOUS, ANXIOUS, OR ON EDGE: NEARLY EVERY DAY
4. TROUBLE RELAXING: NEARLY EVERY DAY
GAD7 TOTAL SCORE: 17
3. WORRYING TOO MUCH ABOUT DIFFERENT THINGS: NEARLY EVERY DAY
6. BECOMING EASILY ANNOYED OR IRRITABLE: SEVERAL DAYS
IF YOU CHECKED OFF ANY PROBLEMS ON THIS QUESTIONNAIRE, HOW DIFFICULT HAVE THESE PROBLEMS MADE IT FOR YOU TO DO YOUR WORK, TAKE CARE OF THINGS AT HOME, OR GET ALONG WITH OTHER PEOPLE: EXTREMELY DIFFICULT
7. FEELING AFRAID AS IF SOMETHING AWFUL MIGHT HAPPEN: NEARLY EVERY DAY
1. FEELING NERVOUS, ANXIOUS, OR ON EDGE: NEARLY EVERY DAY
IF YOU CHECKED OFF ANY PROBLEMS ON THIS QUESTIONNAIRE, HOW DIFFICULT HAVE THESE PROBLEMS MADE IT FOR YOU TO DO YOUR WORK, TAKE CARE OF THINGS AT HOME, OR GET ALONG WITH OTHER PEOPLE: EXTREMELY DIFFICULT
6. BECOMING EASILY ANNOYED OR IRRITABLE: SEVERAL DAYS
4. TROUBLE RELAXING: NEARLY EVERY DAY

## 2025-03-14 ASSESSMENT — PATIENT HEALTH QUESTIONNAIRE - PHQ9
3. TROUBLE FALLING OR STAYING ASLEEP OR SLEEPING TOO MUCH: 3
4. FEELING TIRED OR HAVING LITTLE ENERGY: 2
4. FEELING TIRED OR HAVING LITTLE ENERGY: MORE THAN HALF THE DAYS
6. FEELING BAD ABOUT YOURSELF - OR THAT YOU ARE A FAILURE OR HAVE LET YOURSELF OR YOUR FAMILY DOWN: NEARLY EVERY DAY
SUM OF ALL RESPONSES TO PHQ QUESTIONS 1-9: 21
8. MOVING OR SPEAKING SO SLOWLY THAT OTHER PEOPLE COULD HAVE NOTICED. OR THE OPPOSITE, BEING SO FIGETY OR RESTLESS THAT YOU HAVE BEEN MOVING AROUND A LOT MORE THAN USUAL: 1
1. LITTLE INTEREST OR PLEASURE IN DOING THINGS: NEARLY EVERY DAY
10. IF YOU CHECKED OFF ANY PROBLEMS, HOW DIFFICULT HAVE THESE PROBLEMS MADE IT FOR YOU TO DO YOUR WORK, TAKE CARE OF THINGS AT HOME, OR GET ALONG WITH OTHER PEOPLE: EXTREMELY DIFFICULT
5. POOR APPETITE OR OVEREATING: 1 - SEVERAL DAYS
3. TROUBLE FALLING OR STAYING ASLEEP OR SLEEPING TOO MUCH: NEARLY EVERY DAY
6. FEELING BAD ABOUT YOURSELF - OR THAT YOU ARE A FAILURE OR HAVE LET YOURSELF OR YOUR FAMILY DOWN: 3
5. POOR APPETITE OR OVEREATING: 1
7. TROUBLE CONCENTRATING ON THINGS, SUCH AS READING THE NEWSPAPER OR WATCHING TELEVISION: MORE THAN HALF THE DAYS
CLINICAL INTERPRETATION OF PHQ2 SCORE: 0
2. FEELING DOWN, DEPRESSED, IRRITABLE, OR HOPELESS: 3
5. POOR APPETITE OR OVEREATING: SEVERAL DAYS
9. THOUGHTS THAT YOU WOULD BE BETTER OFF DEAD, OR OF HURTING YOURSELF: NEARLY EVERY DAY
8. MOVING OR SPEAKING SO SLOWLY THAT OTHER PEOPLE COULD HAVE NOTICED. OR THE OPPOSITE, BEING SO FIGETY OR RESTLESS THAT YOU HAVE BEEN MOVING AROUND A LOT MORE THAN USUAL: SEVERAL DAYS
9. THOUGHTS THAT YOU WOULD BE BETTER OFF DEAD, OR OF HURTING YOURSELF: 3
2. FEELING DOWN, DEPRESSED, IRRITABLE, OR HOPELESS: NEARLY EVERY DAY
1. LITTLE INTEREST OR PLEASURE IN DOING THINGS: 3
7. TROUBLE CONCENTRATING ON THINGS, SUCH AS READING THE NEWSPAPER OR WATCHING TELEVISION: 2

## 2025-03-14 NOTE — PROGRESS NOTES
This evaluation was conducted via Teams using secure and encrypted videoconferencing technology. The patient was in their home in the [facility].    The patient's identity was confirmed and verbal consent was obtained for this virtual visit.      PSYCHIATRY FOLLOW-UP NOTE      Name: Leroy Naylor  MRN: [id number]  : 1985  Age: 39 y.o.  Date of assessment: 3/14/2025  PCP: GLADIS Zamorano.  Persons in attendance: Patient      REASON FOR VISIT/CHIEF COMPLAINT (as stated by Patient):  Leroy Naylor is a 39 y.o., White male, attending follow-up appointment for mood and anxiety management.      HISTORY OF PRESENT ILLNESS:  Leroy Naylor is a 39 y.o. old male with MDD and ZULMA comes in today for follow up. Patient was last seen 1 year and 4 months ago, and following treatment planning recommendations were done:  Continue Lexapro 20 mg daily for mood and anxiety.  Continue Lamictal 100 mg AM- 200 mg HS for mood stabilization.  Continue Abilify 5 mg daily for mood stabilization.  Patient not interested in psychotherapy.    History of Present Illness    He has been off all medications for approximately 1 year. Initially, he did not perceive any significant changes; however, he currently feels as though he has reached a mental mari and is grappling with severe depression and anxiety.   PHQ9: 15 with dominance of low interest, feeling down, poor sleep, guilt, poor concentration, low energy and chronic passive death wishes. Safety assessment done. Daughter & Mother is the protective factor.  Patient understands the importance of calling 911 or [phone] or going to nearest emergency room if worsening of suicidal ideations or safety concern is noted.  GAD7: 17: with dominance of feeling anxious on a daily basis with worrying about multiple topics, trouble relaxing, restlessness and irritability.  He acknowledges a lifelong history of suicidal ideations but emphasizes the strength of his support system,  which includes his daughter and recently reconnected mother.   He expresses a desire to improve his mental health, citing his numerous responsibilities, including a second job, as motivation.   He is open to resuming medication, having found them beneficial in the past, but wishes to avoid lithium.   He recalls that his mother, who shares similar symptoms, found relief with lamotrigine, seroquel and adderall. He is considering trying Seroquel and lamotrigine, which have been effective for his mother.   He is also contemplating psychotherapy or talk counseling to address unresolved trauma.   He has reduced his cannabis use, abstaining for 2 weeks, but continues to use it sparingly to manage anxiety and panic attacks. He is considering discontinuing cannabis use but finds it currently beneficial for his anxiety.     He has been managing his diabetes with medication and notes an improvement in his condition. He reports some nerve issues, including restless legs. He is on metformin and Mounjaro, which have resulted in a weight loss of approximately 30 pounds since starting the latter.   Extensive discussion done on the impact of Seroquel on metabolic syndrome with weight gain and worsening diabetes.  Patient is willing to monitor his weight closely and will check HbA1c in the near future and will stop Seroquel if worsening is noted.  Patient is already on metformin at this time.  Agreed with plan of adding Cymbalta for depression and anxiety, using Vistaril as needed for anxiety and restarting Lamictal as discussed below.    Discussed IOP, TMS and Esketamine option for treatment resistant depression, but not able to do due to time constraints.    CURRENT MEDICATIONS:  Current Outpatient Medications   Medication Sig Dispense Refill    doxycycline (VIBRAMYCIN) 100 MG Tab Take 1 Tablet by mouth 2 times a day for 7 days. 14 Tablet 0    ibuprofen (MOTRIN) 600 MG Tab Take 1 Tablet by mouth every 6 hours as needed for  Inflammation, Moderate Pain or Mild Pain. 30 Tablet 0    atorvastatin (LIPITOR) 10 MG Tab TAKE 1 TABLET BY MOUTH DAILY 90 Tablet 0    Tirzepatide 5 MG/0.5ML Solution Auto-injector Inject 5 mg under the skin every 7 days. 6 mL 3    metFORMIN (GLUCOPHAGE) 500 MG Tab Take 2 Tablets by mouth 2 times a day with meals. 360 Tablet 3    Continuous Glucose Sensor (FREESTYLE [place] 3 SENSOR) Misc 1 Application every 14 days. 6 Each 3     No current facility-administered medications for this visit.       MEDICAL HISTORY  Past Medical History:   Diagnosis Date    Bipolar 2 disorder (HCC)     Body mass index (BMI)40.0-44.9, adult 8/16/2021    Diabetes (HCC)     Sleep apnea      No past surgical history on file.      PAST PSYCHIATRIC MEDICATIONS  Prozac (longest), Zoloft, Lexapro  No SNRI or Wellbutrin trial  Viibryd, Vortioxetine  Lamictal  Abilify, Rexulti  Ambien, Trazodone  Xanax, Ativan        MEDICAL REVIEW OF SYSTEMS:   Constitutional negative   Eyes negative   Ears/Nose/Mouth/Throat negative   Cardiovascular negative   Respiratory  RIK   Gastrointestinal negative   Genitourinary negative   Muscular negative   Integumentary negative   Neurological negative   Endocrine  DM Type 2   Hematologic/Lymphatic negative     PHYSICAL EXAMINAION:  Vital signs: There were no vitals taken for this visit.  Musculoskeletal: Normal gait.   Abnormal movements: none      MENTAL STATUS EXAMINATION      General:   - Grooming and hygiene: Casual,   - Apparent distress: tense,   - Behavior: Tense  - Eye Contact:  Good,   - no psychomotor agitation or retardation    - Participation: Active verbal participation  Orientation: Alert and Fully Oriented to person, place and time  Mood: Depressed and Anxious  Affect: Constricted,  Thought Process: Logical and Goal-directed  Thought Content: Denies suicidal or homicidal ideations, intent or plan   Perception: Denies auditory or visual hallucinations. No delusions noted   Attention span and  concentration: Intact   Speech:Rate within normal limits and Volume within normal limits  Language: Appropriate   Insight: Good  Judgment: Good  Recent and remote memory: No gross evidence of memory deficits        DEPRESSION SCREENIN/13/2023     2:00 PM 2024    10:32 AM 3/14/2025     8:30 AM   Depression Screen (PHQ-2/PHQ-9)   PHQ-2 Total Score  0    PHQ-2 Total Score 5     PHQ-9 Total Score  7    PHQ-9 Total Score 23  21       Interpretation of PHQ-9 Total Score   Score Severity   1-4 No Depression   5-9 Mild Depression   10-14 Moderate Depression   15-19 Moderately Severe Depression   20-27 Severe Depression    CURRENT RISK:       Suicidal: Low       Homicidal: Low       Self-Harm: Low       Relapse: Low       Crisis Safety Plan Reviewed Not Indicated       If evidence of imminent risk is present, intervention/plan:      MEDICAL RECORDS/LABS/DIAGNOSTIC TESTS REVIEWED:  No new lab since last visit     [facility] records -   Reviewed     PLAN:  (1) MDD, severe with chronic passive death wishes; h/o bipolar disorder (2) ZULMA; (3) Cannabis use  Persistent depression and anxiety  Add Cymbalta 30 mg daily for mood and anxiety.  Restart Lamictal 25 mg daily X 2 weeks --> 50 mg daily for mood stabilization.  Add Vistaril 25 mg PRN for anxiety attacks.  Add Seroquel 12.5-25-37.5-50 mg HS PRN for mood stabilization.  Patient on metformin and will monitor weight and HbA1c.  Patient not interested in psychotherapy  Medication options, alternatives (including no medications) and medication risks/benefits/side effects were discussed in detail.  The patient was advised to call, message provider on [employer], or come in to the clinic if symptoms worsen or if any future questions/issues regarding their medications arise; the patient verbalized understanding and agreement.   The patient was educated to call 911, call the suicide hotline, or go to local ER if having thoughts of suicide or homicide; verbalized  understanding.      Billing Coding based on:  34806 based on MDM    Return to clinic in 3-4 weeks or sooner if symptoms worsen.  Next Appointment: instruction provided on how to make the next appointment.     The proposed treatment plan was discussed with the patient who was provided the opportunity to ask questions and make suggestions regarding alternative treatment. Patient verbalized understanding and expressed agreement with the plan.       Alden James M.D.  03/14/25    This note was created using voice recognition software ([employer]). The accuracy of the dictation is limited by the abilities of the software. I have reviewed the note prior to signing, however some errors in grammar and context are still possible. If you have any questions related to this note please do not hesitate to contact our office.

## 2025-04-09 DIAGNOSIS — E78.5 DYSLIPIDEMIA: ICD-10-CM

## 2025-04-09 DIAGNOSIS — E11.9 TYPE 2 DIABETES MELLITUS WITHOUT COMPLICATION, WITHOUT LONG-TERM CURRENT USE OF INSULIN (HCC): ICD-10-CM

## 2025-04-09 DIAGNOSIS — E11.65 TYPE 2 DIABETES MELLITUS WITH HYPERGLYCEMIA, WITHOUT LONG-TERM CURRENT USE OF INSULIN (HCC): ICD-10-CM

## 2025-04-09 RX ORDER — ATORVASTATIN CALCIUM 10 MG/1
10 TABLET, FILM COATED ORAL DAILY
Qty: 90 TABLET | Refills: 0 | Status: SHIPPED | OUTPATIENT
Start: 2025-04-09

## 2025-04-09 NOTE — TELEPHONE ENCOUNTER
Received request via: Pharmacy    Was the patient seen in the last year in this department? Yes    Does the patient have an active prescription (recently filled or refills available) for medication(s) requested? No    [id number] - [place], [place] - [address]     Does the patient have long-term Plus and need 100-day supply? (This applies to ALL medications) Patient does not have SCP

## 2025-04-10 ENCOUNTER — TELEMEDICINE (OUTPATIENT)
Dept: BEHAVIORAL HEALTH | Facility: CLINIC | Age: 40
End: 2025-04-10
Payer: COMMERCIAL

## 2025-04-10 DIAGNOSIS — F41.1 GENERALIZED ANXIETY DISORDER: ICD-10-CM

## 2025-04-10 DIAGNOSIS — F33.2 SEVERE EPISODE OF RECURRENT MAJOR DEPRESSIVE DISORDER, WITHOUT PSYCHOTIC FEATURES (HCC): ICD-10-CM

## 2025-04-10 PROCEDURE — 90791 PSYCH DIAGNOSTIC EVALUATION: CPT | Performed by: MARRIAGE & FAMILY THERAPIST

## 2025-04-10 NOTE — PROGRESS NOTES
[facility]   Initial Assessment    Name: Leroy Naylor  MRN: [id number]  : 1985  Age: 39 y.o.  Date of assessment: 4/10/2025  PCP: GLADIS Zamorano.  Persons in attendance: Patient    CHIEF COMPLAINT AND HISTORY OF PRESENTING PROBLEM:  Leroy Naylor is a 39 y.o., White male referred for assessment by No ref. provider found.    Treating psychiatrist notes in recent visit w Pt that he is 'contemplating psychotherapy or talk counseling to address unresolved trauma, but then added, x2, 'Patient not interested in psychotherapy.', per Pt , that was Pt's position a few years ago, but is presently following thru on Jacob's suggestion to do talk Tx in addition to meds.    Pt reports he's taking his meds as Rx'd, not feeling any benefit thus far. Pt feels he hit rock bottom, prompting him to reach out to Dr James.    Pt reports he's not sure what to expect from talk therapy, or how it could help.    States 'I've always been depressed, my whole life. I have anxiety too. I've never really seen a point to life.'        BEHAVIORAL HEALTH TREATMENT HISTORY  Does patient/parent report a history of prior behavioral health treatment for patient? Pt  has tried talk Tx in past, but didn't feel any benefit from it, the therapist didn't follow thru, communicated poorly, etc.    FAMILY/SOCIAL HISTORY  Current living situation/household members: What keeps you going? My 16 y-o daughter  Does patient/parent report a family history of behavioral health issues, diagnoses, or treatment?   Family History   Problem Relation Age of Onset    No Known Problems Mother     Heart Disease Father     Diabetes Father     No Known Problems Sister     No Known Problems Sister     No Known Problems Brother     Multiple Sclerosis Maternal Aunt     Multiple Sclerosis Maternal Uncle     Cancer Maternal Grandmother         uterine    Diabetes Maternal Grandmother     Atrial fibrillation Maternal Grandmother     Heart Attack  Maternal Grandfather 60    Pancreatic Cancer Maternal Grandfather         EMPLOYMENT/RESOURCES  Is the patient currently employed? Yes  Does the patient/parent report adequate financial resources? Yes    ABUSE/NEGLECT/TRAUMA SCREENING  Does patient report feeling “unsafe” in his/her home, or afraid of anyone? No  Does patient report any history of physical, sexual, or emotional abuse? No  Is there evidence of neglect by self? No                                                                                                        SAFETY ASSESSMENT - SELF  Does patient acknowledge current or past symptoms of dangerousness to self? Yes, long ago, but not any active thoughts of or intent to harm self  Recent change in frequency/specificity/intensity of suicidal thoughts or self-harm behavior? No  Current access to firearms, medications, or other identified means of suicide/self-harm? No  If yes, willing to restrict access to means of suicide/self-harm? Yes  Current Suicide Risk: Low  Crisis Safety Plan completed and copy given to patient: no    SAFETY ASSESSMENT - OTHERS  Recent change in frequency/specificity/intensity of thoughts or threats to harm others? No  Current Homicide Risk:  Low  Crisis Safety Plan completed and copy given to patient? no  Based on information provided during the current assessment, is a mandated “duty to warn” being exercised? No    SUBSTANCE USE/HISTORY  Yes, marijuana, 'I'm addicted to it, it helps with anxiety, a lot.'    MENTAL STATUS/OBSERVATIONS              Participation: Limited verbal participation  Grooming: casual  Orientation:Alert   Behavior: Calm  Mood: flat  Affect:Flat  Thought process: Logical  Speech: Rate within normal limits and Volume within normal limits  Memory: No gross evidence of memory deficits  Insight: Limited  Judgment:  Adequate    Patient's motivation/readiness for change: virtual meetings best for Pt, but says he could come to [place] at least for the next  session    Care plan completed:  'All I've ever known is being depressed.' There's no point, my life is just prolonged suffering, all the way back to childhood.    When queried about what keeps you going? He responded his daughter, family, my animals. I proposed to Pt that maybe he and I could help him 'find a point to life,' his life.    Does patient express agreement with the plan? Yes     Diagnosis: F33.2 MDD, Recurrent, Severe; F41.1 ZULMA    YURY Turner

## 2025-04-16 ENCOUNTER — TELEMEDICINE (OUTPATIENT)
Dept: BEHAVIORAL HEALTH | Facility: CLINIC | Age: 40
End: 2025-04-16
Payer: COMMERCIAL

## 2025-04-16 DIAGNOSIS — F41.1 GAD (GENERALIZED ANXIETY DISORDER): ICD-10-CM

## 2025-04-16 DIAGNOSIS — F33.2 SEVERE EPISODE OF RECURRENT MAJOR DEPRESSIVE DISORDER, WITHOUT PSYCHOTIC FEATURES (HCC): ICD-10-CM

## 2025-04-16 DIAGNOSIS — G47.09 OTHER INSOMNIA: ICD-10-CM

## 2025-04-16 PROCEDURE — 99214 OFFICE O/P EST MOD 30 MIN: CPT | Mod: 95 | Performed by: PSYCHIATRY & NEUROLOGY

## 2025-04-16 RX ORDER — ZOLPIDEM TARTRATE 5 MG/1
5 TABLET ORAL NIGHTLY PRN
Qty: 15 TABLET | Refills: 0 | Status: SHIPPED | OUTPATIENT
Start: 2025-04-16 | End: 2025-05-01

## 2025-04-16 RX ORDER — LAMOTRIGINE 100 MG/1
100 TABLET ORAL DAILY
Qty: 30 TABLET | Refills: 1 | Status: SHIPPED | OUTPATIENT
Start: 2025-04-16

## 2025-04-16 RX ORDER — DULOXETIN HYDROCHLORIDE 60 MG/1
60 CAPSULE, DELAYED RELEASE ORAL DAILY
Qty: 30 CAPSULE | Refills: 1 | Status: SHIPPED | OUTPATIENT
Start: 2025-04-16

## 2025-04-16 ASSESSMENT — ANXIETY QUESTIONNAIRES
1. FEELING NERVOUS, ANXIOUS, OR ON EDGE: NEARLY EVERY DAY
1. FEELING NERVOUS, ANXIOUS, OR ON EDGE: NEARLY EVERY DAY
5. BEING SO RESTLESS THAT IT IS HARD TO SIT STILL: NOT AT ALL
GAD7 TOTAL SCORE: 10
2. NOT BEING ABLE TO STOP OR CONTROL WORRYING: MORE THAN HALF THE DAYS
3. WORRYING TOO MUCH ABOUT DIFFERENT THINGS: MORE THAN HALF THE DAYS
3. WORRYING TOO MUCH ABOUT DIFFERENT THINGS: MORE THAN HALF THE DAYS
6. BECOMING EASILY ANNOYED OR IRRITABLE: SEVERAL DAYS
7. FEELING AFRAID AS IF SOMETHING AWFUL MIGHT HAPPEN: SEVERAL DAYS
4. TROUBLE RELAXING: SEVERAL DAYS
5. BEING SO RESTLESS THAT IT IS HARD TO SIT STILL: NOT AT ALL
7. FEELING AFRAID AS IF SOMETHING AWFUL MIGHT HAPPEN: SEVERAL DAYS
4. TROUBLE RELAXING: SEVERAL DAYS
6. BECOMING EASILY ANNOYED OR IRRITABLE: SEVERAL DAYS
2. NOT BEING ABLE TO STOP OR CONTROL WORRYING: MORE THAN HALF THE DAYS

## 2025-04-16 NOTE — PROGRESS NOTES
This evaluation was conducted via Teams using secure and encrypted videoconferencing technology. The patient was in their home in the [facility].    The patient's identity was confirmed and verbal consent was obtained for this virtual visit.      PSYCHIATRY FOLLOW-UP NOTE      Name: Leroy Naylor  MRN: [id number]  : 1985  Age: 39 y.o.  Date of assessment: 2025  PCP: GLADIS Zamorano.  Persons in attendance: Patient      REASON FOR VISIT/CHIEF COMPLAINT (as stated by Patient):  Leroy Naylor is a 39 y.o., White male, attending follow-up appointment for mood and anxiety management.      HISTORY OF PRESENT ILLNESS:  Leroy Naylor is a 39 y.o. old male with MDD, ZULMA and insomnia comes in today for follow up. Patient was last seen 1 month ago, and following treatment planning recommendations were done:  Add Cymbalta 30 mg daily for mood and anxiety.  Restart Lamictal 25 mg daily X 2 weeks --> 50 mg daily for mood stabilization.  Add Vistaril 25 mg PRN for anxiety attacks.  Add Seroquel 12.5-25-37.5-50 mg HS PRN for mood stabilization.  Patient on metformin and will monitor weight and HbA1c.  Patient not interested in psychotherapy    History of Present Illness    Seroquel induces drowsiness but exacerbates his restless leg syndrome, which he experiences approximately 30 to 45 minutes post-administration. This side effect is so severe that it disrupts his sleep, a problem not previously encountered. He has self discontinued the seroquel. Sleep quality remains poor since the last consultation.   Ambien has been used previously for sleep management.  After reviewing risks and benefits agreed with short trial of Ambien.  He has initiated therapy   He has observed a slight improvement in mood, although anxiety levels remain unchanged.   Current medications include Cymbalta 30 mg and Lamictal 75 mg. Seroquel has been discontinued due to its side effects. Vistaril has been tried on a few  occasions.    Agreed with plan of titrating Cymbalta further to 60 mg and Lamictal to 100 mg and adding a short course of Ambien.  Patient understand the controlled nature of Ambien and discussed the importance of doing this for a short course of trial and then assessing if switch can be done at that time if indicated.        CURRENT MEDICATIONS:  Current Outpatient Medications   Medication Sig Dispense Refill    atorvastatin (LIPITOR) 10 MG Tab TAKE 1 TABLET BY MOUTH DAILY 90 Tablet 0    DULoxetine (CYMBALTA) 30 MG Cap DR Particles Take 1 Capsule by mouth 1/2 hour after dinner. 30 Capsule 1    QUEtiapine (SEROQUEL) 25 MG Tab Take 1 to 2 tab at bedtime as needed for sleep 60 Tablet 1    hydrOXYzine pamoate (VISTARIL) 25 MG Cap Take 1 Capsule by mouth 2 times a day as needed for Anxiety. 60 Capsule 1    ibuprofen (MOTRIN) 600 MG Tab Take 1 Tablet by mouth every 6 hours as needed for Inflammation, Moderate Pain or Mild Pain. 30 Tablet 0    Tirzepatide 5 MG/0.5ML Solution Auto-injector Inject 5 mg under the skin every 7 days. 6 mL 3    metFORMIN (GLUCOPHAGE) 500 MG Tab Take 2 Tablets by mouth 2 times a day with meals. 360 Tablet 3    Continuous Glucose Sensor (FREESTYLE FALLON 3 SENSOR) Misc 1 Application every 14 days. 6 Each 3     No current facility-administered medications for this visit.       MEDICAL HISTORY  Past Medical History:   Diagnosis Date    Bipolar 2 disorder (HCC)     Body mass index (BMI)40.0-44.9, adult 8/16/2021    Diabetes (HCC)     Sleep apnea      No past surgical history on file.    PAST PSYCHIATRIC MEDICATIONS  Prozac (longest), Zoloft, Lexapro  No SNRI or Wellbutrin trial  Viibryd, Vortioxetine  Lamictal  Abilify, Rexulti, Seroquel (restless leg)  Ambien, Trazodone  Xanax, Ativan        MEDICAL REVIEW OF SYSTEMS:   Constitutional negative   Eyes negative   Ears/Nose/Mouth/Throat negative   Cardiovascular negative   Respiratory  RIK   Gastrointestinal negative   Genitourinary negative   Muscular  negative   Integumentary negative   Neurological negative   Endocrine  DM Type 2   Hematologic/Lymphatic negative     PHYSICAL EXAMINAION:  Vital signs: There were no vitals taken for this visit.  Musculoskeletal: Normal gait.   Abnormal movements: none      MENTAL STATUS EXAMINATION      General:   - Grooming and hygiene: Casual,   - Apparent distress: tense,   - Behavior: Tense  - Eye Contact:  Good,   - no psychomotor agitation or retardation    - Participation: Active verbal participation  Orientation: Alert and Fully Oriented to person, place and time  Mood: Depressed and Anxious  Affect: Constricted,  Thought Process: Logical and Goal-directed  Thought Content: Denies suicidal or homicidal ideations, intent or plan   Perception: Denies auditory or visual hallucinations. No delusions noted   Attention span and concentration: Intact   Speech:Rate within normal limits and Volume within normal limits  Language: Appropriate   Insight: Good  Judgment: Good  Recent and remote memory: No gross evidence of memory deficits        DEPRESSION SCREENIN/13/2023     2:00 PM 2024    10:32 AM 3/14/2025     8:30 AM   Depression Screen (PHQ-2/PHQ-9)   PHQ-2 Total Score  0    PHQ-2 Total Score 5  0   PHQ-9 Total Score  7    PHQ-9 Total Score 23         Interpretation of PHQ-9 Total Score   Score Severity   1-4 No Depression   5-9 Mild Depression   10-14 Moderate Depression   15-19 Moderately Severe Depression   20-27 Severe Depression    CURRENT RISK:       Suicidal: Low       Homicidal: Low       Self-Harm: Low       Relapse: Low       Crisis Safety Plan Reviewed Not Indicated       If evidence of imminent risk is present, intervention/plan:      MEDICAL RECORDS/LABS/DIAGNOSTIC TESTS REVIEWED:  No new lab since last visit     [facility] records -   Reviewed     PLAN:  (1) MDD, severe with chronic passive death wishes; h/o bipolar disorder (2) ZULMA; (3) Cannabis use  Persistent depression and anxiety  Increase Cymbalta to  60 mg daily for mood and anxiety.  Increase Lamictal to 100 mg daily for mood stabilization.  Continue Vistaril 25 mg PRN for anxiety attacks.  Stop Seroquel   Add Ambien 5 mg HS PRN for mood stabilization.  Controlled medication treatment agreement: sent to [facility] today.  Continue psychotherapy  Medication options, alternatives (including no medications) and medication risks/benefits/side effects were discussed in detail.  The patient was advised to call, message provider on [employer], or come in to the clinic if symptoms worsen or if any future questions/issues regarding their medications arise; the patient verbalized understanding and agreement.   The patient was educated to call 911, call the suicide hotline, or go to local ER if having thoughts of suicide or homicide; verbalized understanding.      Billing Coding based on:  70544 based on MDM    Return to clinic in 2 weeks or sooner if symptoms worsen.  Next Appointment: instruction provided on how to make the next appointment.     The proposed treatment plan was discussed with the patient who was provided the opportunity to ask questions and make suggestions regarding alternative treatment. Patient verbalized understanding and expressed agreement with the plan.       Alden James M.D.  04/16/25    This note was created using voice recognition software (Dragon). The accuracy of the dictation is limited by the abilities of the software. I have reviewed the note prior to signing, however some errors in grammar and context are still possible. If you have any questions related to this note please do not hesitate to contact our office.

## 2025-04-30 ENCOUNTER — APPOINTMENT (OUTPATIENT)
Dept: BEHAVIORAL HEALTH | Facility: CLINIC | Age: 40
End: 2025-04-30
Payer: COMMERCIAL

## 2025-05-01 ENCOUNTER — APPOINTMENT (OUTPATIENT)
Dept: BEHAVIORAL HEALTH | Facility: CLINIC | Age: 40
End: 2025-05-01
Payer: COMMERCIAL

## 2025-05-01 DIAGNOSIS — F33.2 SEVERE EPISODE OF RECURRENT MAJOR DEPRESSIVE DISORDER, WITHOUT PSYCHOTIC FEATURES (HCC): ICD-10-CM

## 2025-05-01 DIAGNOSIS — F41.1 GAD (GENERALIZED ANXIETY DISORDER): ICD-10-CM

## 2025-05-01 DIAGNOSIS — G47.09 OTHER INSOMNIA: ICD-10-CM

## 2025-05-01 PROCEDURE — 99214 OFFICE O/P EST MOD 30 MIN: CPT | Mod: 95 | Performed by: PSYCHIATRY & NEUROLOGY

## 2025-05-01 RX ORDER — DULOXETIN HYDROCHLORIDE 60 MG/1
60 CAPSULE, DELAYED RELEASE ORAL DAILY
Qty: 30 CAPSULE | Refills: 1 | Status: SHIPPED | OUTPATIENT
Start: 2025-05-01

## 2025-05-01 RX ORDER — LAMOTRIGINE 200 MG/1
200 TABLET ORAL DAILY
Qty: 30 TABLET | Refills: 1 | Status: SHIPPED | OUTPATIENT
Start: 2025-05-01

## 2025-05-01 RX ORDER — DULOXETIN HYDROCHLORIDE 30 MG/1
30 CAPSULE, DELAYED RELEASE ORAL DAILY
Qty: 30 CAPSULE | Refills: 1 | Status: SHIPPED | OUTPATIENT
Start: 2025-05-01

## 2025-05-01 RX ORDER — ZOLPIDEM TARTRATE 10 MG/1
10 TABLET ORAL NIGHTLY PRN
Qty: 30 TABLET | Refills: 1 | Status: SHIPPED | OUTPATIENT
Start: 2025-05-01 | End: 2025-05-31

## 2025-05-01 ASSESSMENT — PATIENT HEALTH QUESTIONNAIRE - PHQ9
3. TROUBLE FALLING OR STAYING ASLEEP OR SLEEPING TOO MUCH: NEARLY EVERY DAY
5. POOR APPETITE OR OVEREATING: 2 - MORE THAN HALF THE DAYS
1. LITTLE INTEREST OR PLEASURE IN DOING THINGS: 3
8. MOVING OR SPEAKING SO SLOWLY THAT OTHER PEOPLE COULD HAVE NOTICED. OR THE OPPOSITE, BEING SO FIGETY OR RESTLESS THAT YOU HAVE BEEN MOVING AROUND A LOT MORE THAN USUAL: SEVERAL DAYS
9. THOUGHTS THAT YOU WOULD BE BETTER OFF DEAD, OR OF HURTING YOURSELF: 2
CLINICAL INTERPRETATION OF PHQ2 SCORE: 0
4. FEELING TIRED OR HAVING LITTLE ENERGY: 3
4. FEELING TIRED OR HAVING LITTLE ENERGY: NEARLY EVERY DAY
5. POOR APPETITE OR OVEREATING: 2
7. TROUBLE CONCENTRATING ON THINGS, SUCH AS READING THE NEWSPAPER OR WATCHING TELEVISION: 2
9. THOUGHTS THAT YOU WOULD BE BETTER OFF DEAD, OR OF HURTING YOURSELF: MORE THAN HALF THE DAYS
SUM OF ALL RESPONSES TO PHQ QUESTIONS 1-9: 22
2. FEELING DOWN, DEPRESSED, IRRITABLE, OR HOPELESS: 3
10. IF YOU CHECKED OFF ANY PROBLEMS, HOW DIFFICULT HAVE THESE PROBLEMS MADE IT FOR YOU TO DO YOUR WORK, TAKE CARE OF THINGS AT HOME, OR GET ALONG WITH OTHER PEOPLE: VERY DIFFICULT
6. FEELING BAD ABOUT YOURSELF - OR THAT YOU ARE A FAILURE OR HAVE LET YOURSELF OR YOUR FAMILY DOWN: 3
5. POOR APPETITE OR OVEREATING: MORE THAN HALF THE DAYS
1. LITTLE INTEREST OR PLEASURE IN DOING THINGS: NEARLY EVERY DAY
2. FEELING DOWN, DEPRESSED, IRRITABLE, OR HOPELESS: NEARLY EVERY DAY
6. FEELING BAD ABOUT YOURSELF - OR THAT YOU ARE A FAILURE OR HAVE LET YOURSELF OR YOUR FAMILY DOWN: NEARLY EVERY DAY
8. MOVING OR SPEAKING SO SLOWLY THAT OTHER PEOPLE COULD HAVE NOTICED. OR THE OPPOSITE, BEING SO FIGETY OR RESTLESS THAT YOU HAVE BEEN MOVING AROUND A LOT MORE THAN USUAL: 1
3. TROUBLE FALLING OR STAYING ASLEEP OR SLEEPING TOO MUCH: 3
7. TROUBLE CONCENTRATING ON THINGS, SUCH AS READING THE NEWSPAPER OR WATCHING TELEVISION: MORE THAN HALF THE DAYS

## 2025-05-01 ASSESSMENT — ANXIETY QUESTIONNAIRES
5. BEING SO RESTLESS THAT IT IS HARD TO SIT STILL: MORE THAN HALF THE DAYS
4. TROUBLE RELAXING: MORE THAN HALF THE DAYS
3. WORRYING TOO MUCH ABOUT DIFFERENT THINGS: NEARLY EVERY DAY
1. FEELING NERVOUS, ANXIOUS, OR ON EDGE: MORE THAN HALF THE DAYS
4. TROUBLE RELAXING: MORE THAN HALF THE DAYS
2. NOT BEING ABLE TO STOP OR CONTROL WORRYING: NEARLY EVERY DAY
GAD7 TOTAL SCORE: 17
IF YOU CHECKED OFF ANY PROBLEMS ON THIS QUESTIONNAIRE, HOW DIFFICULT HAVE THESE PROBLEMS MADE IT FOR YOU TO DO YOUR WORK, TAKE CARE OF THINGS AT HOME, OR GET ALONG WITH OTHER PEOPLE: VERY DIFFICULT
IF YOU CHECKED OFF ANY PROBLEMS ON THIS QUESTIONNAIRE, HOW DIFFICULT HAVE THESE PROBLEMS MADE IT FOR YOU TO DO YOUR WORK, TAKE CARE OF THINGS AT HOME, OR GET ALONG WITH OTHER PEOPLE: VERY DIFFICULT
2. NOT BEING ABLE TO STOP OR CONTROL WORRYING: NEARLY EVERY DAY
6. BECOMING EASILY ANNOYED OR IRRITABLE: MORE THAN HALF THE DAYS
5. BEING SO RESTLESS THAT IT IS HARD TO SIT STILL: MORE THAN HALF THE DAYS
3. WORRYING TOO MUCH ABOUT DIFFERENT THINGS: NEARLY EVERY DAY
1. FEELING NERVOUS, ANXIOUS, OR ON EDGE: MORE THAN HALF THE DAYS
7. FEELING AFRAID AS IF SOMETHING AWFUL MIGHT HAPPEN: NEARLY EVERY DAY
7. FEELING AFRAID AS IF SOMETHING AWFUL MIGHT HAPPEN: NEARLY EVERY DAY
6. BECOMING EASILY ANNOYED OR IRRITABLE: MORE THAN HALF THE DAYS

## 2025-05-01 NOTE — PROGRESS NOTES
This evaluation was conducted via Teams using secure and encrypted videoconferencing technology. The patient was in their home in the [facility].    The patient's identity was confirmed and verbal consent was obtained for this virtual visit.      PSYCHIATRY FOLLOW-UP NOTE      Name: Leroy Naylor  MRN: [id number]  : 1985  Age: 39 y.o.  Date of assessment: 2025  PCP: GLADIS Zamorano.  Persons in attendance: Patient      REASON FOR VISIT/CHIEF COMPLAINT (as stated by Patient):  Leroy Naylor is a 39 y.o., White male, attending follow-up appointment for mood and anxiety management.      HISTORY OF PRESENT ILLNESS:  Leroy Naylor is a 39 y.o. old male with MDD and ZULMA comes in today for follow up. Patient was last seen 2 weeks ago, and following treatment planning recommendations were done:  Increase Cymbalta to 60 mg daily for mood and anxiety.  Increase Lamictal to 100 mg daily for mood stabilization.  Continue Vistaril 25 mg PRN for anxiety attacks.  Stop Seroquel   Add Ambien 5 mg HS PRN for mood stabilization.  Controlled medication treatment agreement: sent to [facility] today.  Continue psychotherapy    History of Present Illness    Ambien 5 mg has been utilized intermittently, with a reported efficacy rate of approximately 50 percent. No adverse effects from the medication are reported.   Seroquel was previously tried but resulted in significant leg discomfort, preventing sleep within a half-hour of administration. No restless legs are experienced as a side effect of Ambien.  Despite an increase in Cymbalta dosage to 60 mg and Lamictal to 100 mg, no noticeable changes in symptoms are reported.   Cannabis use continues a few times per week.  Motivated to work on cutting down cannabis as this can impact the positive response from our treatment as well.  Therapy was recently initiated, with a follow-up appointment scheduled in 2 weeks.   Agreed with plan of increasing Cymbalta,  Lamictal and Ambien as discussed below and given 3 to 4 weeks time for medications to reach therapeutic level.        CURRENT MEDICATIONS:  Current Outpatient Medications   Medication Sig Dispense Refill    DULoxetine (CYMBALTA) 60 MG Cap DR Particles delayed-release capsule Take 1 Capsule by mouth every day. 30 Capsule 1    lamoTRIgine (LAMICTAL) 100 MG Tab Take 1 Tablet by mouth every day. 30 Tablet 1    zolpidem (AMBIEN) 5 MG Tab Take 1 Tablet by mouth at bedtime as needed for Sleep for up to 15 days. 15 Tablet 0    atorvastatin (LIPITOR) 10 MG Tab TAKE 1 TABLET BY MOUTH DAILY 90 Tablet 0    hydrOXYzine pamoate (VISTARIL) 25 MG Cap Take 1 Capsule by mouth 2 times a day as needed for Anxiety. 60 Capsule 1    ibuprofen (MOTRIN) 600 MG Tab Take 1 Tablet by mouth every 6 hours as needed for Inflammation, Moderate Pain or Mild Pain. 30 Tablet 0    Tirzepatide 5 MG/0.5ML Solution Auto-injector Inject 5 mg under the skin every 7 days. 6 mL 3    metFORMIN (GLUCOPHAGE) 500 MG Tab Take 2 Tablets by mouth 2 times a day with meals. 360 Tablet 3    Continuous Glucose Sensor (FREESTYLE FALLON 3 SENSOR) Misc 1 Application every 14 days. 6 Each 3     No current facility-administered medications for this visit.       MEDICAL HISTORY  Past Medical History:   Diagnosis Date    Bipolar 2 disorder (HCC)     Body mass index (BMI)40.0-44.9, adult 8/16/2021    Diabetes (HCC)     Sleep apnea      No past surgical history on file.      PAST PSYCHIATRIC MEDICATIONS  Prozac (longest), Zoloft, Lexapro  Cymbalta  Viibryd, Vortioxetine  Lamictal  Abilify, Rexulti, Seroquel (restless leg)  Ambien, Trazodone  Xanax, Ativan        MEDICAL REVIEW OF SYSTEMS:   Constitutional negative   Eyes negative   Ears/Nose/Mouth/Throat negative   Cardiovascular negative   Respiratory  RIK   Gastrointestinal negative   Genitourinary negative   Muscular negative   Integumentary negative   Neurological negative   Endocrine  DM Type 2   Hematologic/Lymphatic  negative     PHYSICAL EXAMINAION:  Vital signs: There were no vitals taken for this visit.  Musculoskeletal: Normal gait.   Abnormal movements: none      MENTAL STATUS EXAMINATION    General:   - Grooming and hygiene: Casual,   - Apparent distress: tense,   - Behavior: Tense  - Eye Contact:  Good,   - no psychomotor agitation or retardation    - Participation: Active verbal participation  Orientation: Alert and Fully Oriented to person, place and time  Mood: Depressed and Anxious  Affect: Constricted,  Thought Process: Logical and Goal-directed  Thought Content: Denies suicidal or homicidal ideations, intent or plan   Perception: Denies auditory or visual hallucinations. No delusions noted   Attention span and concentration: Intact   Speech:Rate within normal limits and Volume within normal limits  Language: Appropriate   Insight: Good  Judgment: Good  Recent and remote memory: No gross evidence of memory deficits        DEPRESSION SCREENIN/9/2024    10:32 AM 3/14/2025     8:30 AM 2025     9:30 AM   Depression Screen (PHQ-2/PHQ-9)   PHQ-2 Total Score 0     PHQ-2 Total Score  0    PHQ-9 Total Score 7     PHQ-9 Total Score   22       Interpretation of PHQ-9 Total Score   Score Severity   1-4 No Depression   5-9 Mild Depression   10-14 Moderate Depression   15-19 Moderately Severe Depression   20-27 Severe Depression    CURRENT RISK:       Suicidal: Low       Homicidal: Low       Self-Harm: Low       Relapse: Low       Crisis Safety Plan Reviewed Not Indicated       If evidence of imminent risk is present, intervention/plan:      MEDICAL RECORDS/LABS/DIAGNOSTIC TESTS REVIEWED:  No new lab since last visit     [facility] records -   Reviewed     PLAN:  (1) MDD, severe with chronic passive death wishes; h/o bipolar disorder (2) ZULMA; (3) Cannabis use  Persistent depression and anxiety. PHQ9: 20; GAD7: 17  Increase Cymbalta to 90 mg daily for mood and anxiety.  Increase Lamictal to 200 mg daily for mood  stabilization.  Continue Vistaril 25 mg PRN for anxiety attacks.  Increase Ambien 10 mg HS PRN for sleep.  Controlled medication treatment agreement: sent to [facility] in past session.  Continue psychotherapy  Medication options, alternatives (including no medications) and medication risks/benefits/side effects were discussed in detail.  The patient was advised to call, message provider on [employer], or come in to the clinic if symptoms worsen or if any future questions/issues regarding their medications arise; the patient verbalized understanding and agreement.   The patient was educated to call 911, call the suicide hotline, or go to local ER if having thoughts of suicide or homicide; verbalized understanding.      Billing Coding based on:  68256 based on MDM    Return to clinic in 3-4 weeks or sooner if symptoms worsen.  Next Appointment: instruction provided on how to make the next appointment.     The proposed treatment plan was discussed with the patient who was provided the opportunity to ask questions and make suggestions regarding alternative treatment. Patient verbalized understanding and expressed agreement with the plan.       Alden James M.D.  05/01/25    This note was created using voice recognition software (Dragon). The accuracy of the dictation is limited by the abilities of the software. I have reviewed the note prior to signing, however some errors in grammar and context are still possible. If you have any questions related to this note please do not hesitate to contact our office.

## 2025-05-15 ENCOUNTER — APPOINTMENT (OUTPATIENT)
Dept: BEHAVIORAL HEALTH | Facility: CLINIC | Age: 40
End: 2025-05-15
Payer: COMMERCIAL

## 2025-06-12 ENCOUNTER — TELEMEDICINE (OUTPATIENT)
Dept: BEHAVIORAL HEALTH | Facility: CLINIC | Age: 40
End: 2025-06-12
Payer: COMMERCIAL

## 2025-06-12 DIAGNOSIS — F41.1 GAD (GENERALIZED ANXIETY DISORDER): ICD-10-CM

## 2025-06-12 DIAGNOSIS — F33.2 SEVERE EPISODE OF RECURRENT MAJOR DEPRESSIVE DISORDER, WITHOUT PSYCHOTIC FEATURES (HCC): Primary | ICD-10-CM

## 2025-06-12 PROCEDURE — 99214 OFFICE O/P EST MOD 30 MIN: CPT | Mod: 95 | Performed by: PSYCHIATRY & NEUROLOGY

## 2025-06-12 NOTE — PROGRESS NOTES
This evaluation was conducted via Teams using secure and encrypted videoconferencing technology. The patient was in their home in the [facility].    The patient's identity was confirmed and verbal consent was obtained for this virtual visit.      PSYCHIATRY FOLLOW-UP NOTE      Name: Leroy Naylor  MRN: [id number]  : 1985  Age: 40 y.o.  Date of assessment: 2025  PCP: GLADIS Zamorano.  Persons in attendance: Patient      REASON FOR VISIT/CHIEF COMPLAINT (as stated by Patient):  Leroy Naylor is a 40 y.o., White male, attending follow-up appointment for mood and anxiety management.      HISTORY OF PRESENT ILLNESS:  Leroy Naylor is a 40 y.o. old male with MDD and ZULMA with insomnia comes in today for follow up. Patient was last seen 1 month ago, and following treatment planning recommendations were done:  Increase Cymbalta to 90 mg daily for mood and anxiety.  Increase Lamictal to 200 mg daily for mood stabilization.  Continue Vistaril 25 mg PRN for anxiety attacks.  Increase Ambien 10 mg HS PRN for sleep.  Controlled medication treatment agreement: sent to [facility] in past session.  Continue psychotherapy    History of Present Illness    He has been on an increased dosage of Cymbalta 90 mg, lamotrigine 200 mg, and Ambien 10 mg for approximately a month.   He reports that his depression has improved, but he continues to experience anxiety, albeit at a reduced intensity.   He recently experienced the loss of one of his jobs, which he attributes to absenteeism due to sleep disturbances and irritability.   Despite this setback, he does not perceive any significant changes in his reactivity compared to previous episodes.   He has not engaged in therapy since his initial session due to scheduling conflicts with his job timing. His second job has offered him additional hours, bringing his total weekly work hours to approximately 40.   He continues to use cannabis, although he has made  efforts to reduce its frequency to a few times per week.  Extensive psychoeducation provided on the negative impact of cannabis on mood, anxiety and also on the response from our treatment.  Agreed with plan of not doing further medication adjustment for working on first completely stopping the cannabis and making a follow-up 4 weeks after a complete stopping of cannabis to assess the status of underlying mood and anxiety symptoms.    CURRENT MEDICATIONS:  Current Medications[1]    MEDICAL HISTORY  Past Medical History[2]  Past Surgical History[3]      PAST PSYCHIATRIC MEDICATIONS  Prozac (longest), Zoloft, Lexapro  Cymbalta  Viibryd, Vortioxetine  Lamictal  Abilify, Rexulti, Seroquel (restless leg)  Ambien, Trazodone  Xanax, Ativan        MEDICAL REVIEW OF SYSTEMS:   Constitutional negative   Eyes negative   Ears/Nose/Mouth/Throat negative   Cardiovascular negative   Respiratory  RIK   Gastrointestinal negative   Genitourinary negative   Muscular negative   Integumentary negative   Neurological negative   Endocrine  DM Type 2   Hematologic/Lymphatic negative     PHYSICAL EXAMINAION:  Vital signs: There were no vitals taken for this visit.  Musculoskeletal: Normal gait.   Abnormal movements: none      MENTAL STATUS EXAMINATION      General:   - Grooming and hygiene: Casual,   - Apparent distress: none,   - Behavior: Tense  - Eye Contact:  Good,   - no psychomotor agitation or retardation    - Participation: Active verbal participation  Orientation: Alert and Fully Oriented to person, place and time  Mood: Anxious  Affect: Flexible,  Thought Process: Logical and Goal-directed  Thought Content: Denies suicidal or homicidal ideations, intent or plan   Perception: Denies auditory or visual hallucinations. No delusions noted   Attention span and concentration: Intact   Speech:Rate within normal limits and Volume within normal limits  Language: Appropriate   Insight: Good  Judgment: Good  Recent and remote memory: No  gross evidence of memory deficits        DEPRESSION SCREENIN/9/2024    10:32 AM 3/14/2025     8:30 AM 2025     9:30 AM   Depression Screen (PHQ-2/PHQ-9)   PHQ-2 Total Score 0     PHQ-2 Total Score  0 0   PHQ-9 Total Score 7         Interpretation of PHQ-9 Total Score   Score Severity   1-4 No Depression   5-9 Mild Depression   10-14 Moderate Depression   15-19 Moderately Severe Depression   20-27 Severe Depression    CURRENT RISK:       Suicidal: Low       Homicidal: Low       Self-Harm: Low       Relapse: Low       Crisis Safety Plan Reviewed Not Indicated       If evidence of imminent risk is present, intervention/plan:      MEDICAL RECORDS/LABS/DIAGNOSTIC TESTS REVIEWED:  No new lab since last visit     [place]  records -   Reviewed     PLAN:  (1) MDD, severe with chronic passive death wishes; h/o bipolar disorder (2) ZULMA; (3) Cannabis use  Persistent depression and anxiety.   STOP Cannabis completely & follow 4 weeks after stopping  Continue Cymbalta 90 mg daily for mood and anxiety.  Continue Lamictal 200 mg daily for mood stabilization.  Continue Vistaril 25 mg PRN for anxiety attacks.  Continue Ambien 10 mg HS PRN for sleep.  Controlled medication treatment agreement: signed on 25.  Continue psychotherapy  Medication options, alternatives (including no medications) and medication risks/benefits/side effects were discussed in detail.  The patient was advised to call, message provider on [facility], or come in to the clinic if symptoms worsen or if any future questions/issues regarding their medications arise; the patient verbalized understanding and agreement.   The patient was educated to call 911, call the suicide hotline, or go to local ER if having thoughts of suicide or homicide; verbalized understanding.      Billing Coding based on:  [id number] based on [facility]    Return to clinic in 4-8 weeks or sooner if symptoms worsen.  Next Appointment: instruction provided on how to make the next appointment.      The proposed treatment plan was discussed with the patient who was provided the opportunity to ask questions and make suggestions regarding alternative treatment. Patient verbalized understanding and expressed agreement with the plan.       Alden James M.D.  06/12/25    This note was created using voice recognition software (Dragon). The accuracy of the dictation is limited by the abilities of the software. I have reviewed the note prior to signing, however some errors in grammar and context are still possible. If you have any questions related to this note please do not hesitate to contact our office.            [1]   Current Outpatient Medications   Medication Sig Dispense Refill    DULoxetine (CYMBALTA) 60 MG Cap DR Particles delayed-release capsule Take 1 Capsule by mouth every day. (Cymbalta 60 mg + 30 mg = 90 mg daily) 30 Capsule 1    DULoxetine (CYMBALTA) 30 MG Cap DR Particles Take 1 Capsule by mouth every day. (Cymbalta 60 mg + 30 mg = 90 mg daily) 30 Capsule 1    lamotrigine (LAMICTAL) 200 MG tablet Take 1 Tablet by mouth every day. 30 Tablet 1    atorvastatin (LIPITOR) 10 MG Tab TAKE 1 TABLET BY MOUTH DAILY 90 Tablet 0    hydrOXYzine pamoate (VISTARIL) 25 MG Cap Take 1 Capsule by mouth 2 times a day as needed for Anxiety. 60 Capsule 1    ibuprofen (MOTRIN) 600 MG Tab Take 1 Tablet by mouth every 6 hours as needed for Inflammation, Moderate Pain or Mild Pain. 30 Tablet 0    Tirzepatide 5 MG/0.5ML Solution Auto-injector Inject 5 mg under the skin every 7 days. 6 mL 3    metFORMIN (GLUCOPHAGE) 500 MG Tab Take 2 Tablets by mouth 2 times a day with meals. 360 Tablet 3    Continuous Glucose Sensor (FREESTYLE FALLON 3 SENSOR) [facility] [place] Application every 14 days. 6 Each 3     No current facility-administered medications for this visit.   [2]   Past Medical History:  Diagnosis Date    Bipolar 2 disorder (HCC)     Body mass index (BMI)40.0-44.9, adult 8/16/2021    Diabetes (HCC)     Sleep apnea    [3] No past  surgical history on file.

## 2025-06-25 ENCOUNTER — APPOINTMENT (OUTPATIENT)
Dept: BEHAVIORAL HEALTH | Facility: CLINIC | Age: 40
End: 2025-06-25
Payer: COMMERCIAL

## 2025-07-01 ENCOUNTER — APPOINTMENT (OUTPATIENT)
Dept: BEHAVIORAL HEALTH | Facility: CLINIC | Age: 40
End: 2025-07-01
Payer: COMMERCIAL

## 2025-07-01 DIAGNOSIS — F41.1 GENERALIZED ANXIETY DISORDER: ICD-10-CM

## 2025-07-01 DIAGNOSIS — F33.2 SEVERE EPISODE OF RECURRENT MAJOR DEPRESSIVE DISORDER, WITHOUT PSYCHOTIC FEATURES (HCC): Primary | ICD-10-CM

## 2025-07-01 PROCEDURE — 90837 PSYTX W PT 60 MINUTES: CPT | Performed by: MARRIAGE & FAMILY THERAPIST

## 2025-07-01 NOTE — PROGRESS NOTES
[facility]   Therapy Progress Note    Name: Leroy Naylor  MRN: [id number]  : 1985  Age: 40 y.o.  Date of assessment: 2025  PCP: GLADIS Zamorano.  Persons in attendance: Patient  Total session time: 53 m    Pt reports: re 'improvements' that he wants to make in himself:    Learning self control  -reduce impulsivity: overspending/not managing money well/ learn to walk away from confrontations in work, driving, other situations    Less negative thoughts  -It seems like that's all I have is negative thoughts: thoughts of death, nihilistic attitude, I can only see the negative side of things  -I always/only focus on the negative, suffering, what's wrong    Be a better person  -Be able to appreciate positives  -empathize with others and not be apathetic, be able to interact with my family more and better  -Open his perspective taking when interacting with others, be in the moment  -not be a hermit    Be at least 'content' with things  -live in the moment  -find momentary  enjoyment   -care about living    Objective Observations:   Participation:Limited verbal participation and Guarded   Grooming:Casual   Cognition:Alert   Eye Contact:Indirect   Mood:Depressed   Affect:Constricted   Thought Process:Logical   Speech:Rate within normal limits and Soft    Current Risk:   Suicide: low   Homicide: low   Self-Harm: low     Evaluation: Pt recently fired from [employer] job; now working FT as [employer] in [place]. Daughter lives with Pt, Yoselin is a Jr in , daughter's mother , Pt commenting I'm not sad about that'    Mother/stepfather, 2 sisters, aunt/uncle, all are in Pt's life all in [place], who are supportive of Pt    'I work hard at work,' but when at home Pt finds little to no enjoyment in other activities and people    We discussed at length Tx process, Pt's engagement, motivation, preparedness for change    Plan: Minimize depressive symptoms and reengage and life a fulfilled life,  per Pt's qualitative report    Care Plan Updated: Yes    Does patient express agreement with the treatment plan? Yes     Diagnosis: F33.2 MDD, Recurrent, Severe; F41.1 ZULMA     YURY Turner

## 2025-07-06 DIAGNOSIS — F33.2 SEVERE EPISODE OF RECURRENT MAJOR DEPRESSIVE DISORDER, WITHOUT PSYCHOTIC FEATURES (HCC): ICD-10-CM

## 2025-07-06 DIAGNOSIS — F41.1 GAD (GENERALIZED ANXIETY DISORDER): ICD-10-CM

## 2025-07-06 DIAGNOSIS — E11.9 TYPE 2 DIABETES MELLITUS WITHOUT COMPLICATION, WITHOUT LONG-TERM CURRENT USE OF INSULIN (HCC): ICD-10-CM

## 2025-07-06 DIAGNOSIS — E11.65 TYPE 2 DIABETES MELLITUS WITH HYPERGLYCEMIA, WITHOUT LONG-TERM CURRENT USE OF INSULIN (HCC): ICD-10-CM

## 2025-07-07 RX ORDER — DULOXETIN HYDROCHLORIDE 30 MG/1
30 CAPSULE, DELAYED RELEASE ORAL DAILY
Qty: 30 CAPSULE | Refills: 0 | Status: SHIPPED | OUTPATIENT
Start: 2025-07-07

## 2025-07-13 DIAGNOSIS — E78.5 DYSLIPIDEMIA: ICD-10-CM

## 2025-07-13 DIAGNOSIS — E11.9 TYPE 2 DIABETES MELLITUS WITHOUT COMPLICATION, WITHOUT LONG-TERM CURRENT USE OF INSULIN (HCC): ICD-10-CM

## 2025-07-13 DIAGNOSIS — E11.65 TYPE 2 DIABETES MELLITUS WITH HYPERGLYCEMIA, WITHOUT LONG-TERM CURRENT USE OF INSULIN (HCC): ICD-10-CM

## 2025-07-14 NOTE — TELEPHONE ENCOUNTER
Received request via: Patient    Was the patient seen in the last year in this department? Yes    Does the patient have an active prescription (recently filled or refills available) for medication(s) requested? No    [facility]    Does the patient have retirement Plus and need 100-day supply? (This applies to ALL medications) Patient does not have SCP

## 2025-07-15 RX ORDER — ATORVASTATIN CALCIUM 10 MG/1
10 TABLET, FILM COATED ORAL DAILY
Qty: 90 TABLET | Refills: 0 | Status: SHIPPED | OUTPATIENT
Start: 2025-07-15

## 2025-07-24 ENCOUNTER — OFFICE VISIT (OUTPATIENT)
Dept: BEHAVIORAL HEALTH | Facility: CLINIC | Age: 40
End: 2025-07-24
Payer: COMMERCIAL

## 2025-07-24 DIAGNOSIS — F41.1 GENERALIZED ANXIETY DISORDER: ICD-10-CM

## 2025-07-24 DIAGNOSIS — F33.2 SEVERE EPISODE OF RECURRENT MAJOR DEPRESSIVE DISORDER, WITHOUT PSYCHOTIC FEATURES (HCC): Primary | ICD-10-CM

## 2025-07-24 PROCEDURE — 90837 PSYTX W PT 60 MINUTES: CPT | Performed by: MARRIAGE & FAMILY THERAPIST

## 2025-07-24 NOTE — PROGRESS NOTES
[facility]   Therapy Progress Note    Name: Leroy Naylor  MRN: [id number]  : 1985  Age: 40 y.o.  Date of assessment: 2025  PCP: GLADIS Zamorano.  Persons in attendance: Patient  Total session time: 59 m    Pt reports: re his new job? 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 Simultaneously, , he's trying to take my house back, doing yard work, It gives me a sense of accomplishment. Pt describes, it's clear that his daughter is  source of pride, she's 'a great, stabilizing, positive force' in Pt's life.    Pt expressed concerns, specifics, that make him and others, including a prior therapist, tend to believe he 'has ADD.'    We've begun to identify the 'what' you want to change, we transitioned to the 'how' to change it    The 'what':  Learning self control  -reduce impulsivity: overspending/not managing money well/ learn to walk away from confrontations in work, driving, other situations    The 'how,' how can he do it differently?  Take deep breaths  Sooth with senses  Consider other options  Develop routines for self: walking dogs, finances, exercise, etc    Pt's nihilistic beliefs and world view factored significantly into our convo. He stated 'I don't have any plan or intent to kill myself, but if I die, great, the suffering will be over.'    Objective Observations:              Participation:verbal participation and Guarded              Grooming:Casual              Cognition:Alert              Eye Contact:Indirect              Mood:Depressed              Affect:Constricted              Thought Process:Logical              Speech:Rate within normal limits and Soft     Current Risk:              Suicide: low              Homicide: low              Self-Harm: low    Plan: referral made for ADHD eval    Care Plan Updated: Yes    Diagnosis: F33.2 MDD, Recurrent, Severe; F41.1 ZULMA    YURY Turner

## 2025-07-31 ENCOUNTER — DOCUMENTATION (OUTPATIENT)
Dept: BEHAVIORAL HEALTH | Facility: MEDICAL CENTER | Age: 40
End: 2025-07-31

## 2025-07-31 ENCOUNTER — HOSPITAL ENCOUNTER (OUTPATIENT)
Dept: BEHAVIORAL HEALTH | Facility: MEDICAL CENTER | Age: 40
End: 2025-07-31
Attending: FAMILY MEDICINE
Payer: COMMERCIAL

## 2025-07-31 VITALS
OXYGEN SATURATION: 95 % | WEIGHT: 255 LBS | HEART RATE: 75 BPM | DIASTOLIC BLOOD PRESSURE: 68 MMHG | SYSTOLIC BLOOD PRESSURE: 122 MMHG | BODY MASS INDEX: 37.66 KG/M2

## 2025-07-31 DIAGNOSIS — F33.2 SEVERE EPISODE OF RECURRENT MAJOR DEPRESSIVE DISORDER, WITHOUT PSYCHOTIC FEATURES (HCC): Primary | ICD-10-CM

## 2025-07-31 DIAGNOSIS — F41.1 GENERALIZED ANXIETY DISORDER: ICD-10-CM

## 2025-07-31 PROCEDURE — 99213 OFFICE O/P EST LOW 20 MIN: CPT

## 2025-07-31 RX ORDER — ZOLPIDEM TARTRATE 5 MG/1
5 TABLET ORAL NIGHTLY PRN
COMMUNITY

## 2025-07-31 ASSESSMENT — ANXIETY QUESTIONNAIRES
4. TROUBLE RELAXING: NEARLY EVERY DAY
7. FEELING AFRAID AS IF SOMETHING AWFUL MIGHT HAPPEN: MORE THAN HALF THE DAYS
1. FEELING NERVOUS, ANXIOUS, OR ON EDGE: NEARLY EVERY DAY
GAD7 TOTAL SCORE: 17
6. BECOMING EASILY ANNOYED OR IRRITABLE: MORE THAN HALF THE DAYS
5. BEING SO RESTLESS THAT IT IS HARD TO SIT STILL: MORE THAN HALF THE DAYS
3. WORRYING TOO MUCH ABOUT DIFFERENT THINGS: MORE THAN HALF THE DAYS
2. NOT BEING ABLE TO STOP OR CONTROL WORRYING: NEARLY EVERY DAY

## 2025-07-31 ASSESSMENT — ENCOUNTER SYMPTOMS
COUGH: 0
HEARTBURN: 0
BRUISES/BLEEDS EASILY: 0
NERVOUS/ANXIOUS: 1
NAUSEA: 0
FEVER: 0
DEPRESSION: 1
BLURRED VISION: 0
CARDIOVASCULAR NEGATIVE: 1
TINGLING: 0
DOUBLE VISION: 0
PALPITATIONS: 0
EYES NEGATIVE: 1
MYALGIAS: 0
DIZZINESS: 0
CONSTITUTIONAL NEGATIVE: 1
HEMOPTYSIS: 0
NEUROLOGICAL NEGATIVE: 1
HEADACHES: 0
RESPIRATORY NEGATIVE: 1
CHILLS: 0
MUSCULOSKELETAL NEGATIVE: 1
GASTROINTESTINAL NEGATIVE: 1

## 2025-07-31 ASSESSMENT — PATIENT HEALTH QUESTIONNAIRE - PHQ9
5. POOR APPETITE OR OVEREATING: 2 - MORE THAN HALF THE DAYS
CLINICAL INTERPRETATION OF PHQ2 SCORE: 5
SUM OF ALL RESPONSES TO PHQ QUESTIONS 1-9: 21

## 2025-07-31 NOTE — ADDENDUM NOTE
Encounter addended by: Virginia Stovall, Student on: 7/31/2025 3:03 PM   Actions taken: Charge Capture section accepted

## 2025-07-31 NOTE — PROGRESS NOTES
Psychiatric Progress Note               Author: Martín Vázquez M.D. Date & Time created: 7/31/2025  2:48 PM     Interval History:  Long history of MDD and ZULMA, mentioned by therapist that he may have some issues with attention.  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 Improvement of anxiety and MDD may help in this regard.      Patient was advised on dietary changes that may be beneficial in ADD including elimination of artificial dyes (especially red), preservatives, and sugar. Also advised to increase aerobic and anaerobic exercise.      Review of Systems:  Review of Systems   Constitutional: Negative.  Negative for chills and fever.   HENT: Negative.  Negative for hearing loss.    Eyes: Negative.  Negative for blurred vision and double vision.   Respiratory: Negative.  Negative for cough and hemoptysis.    Cardiovascular: Negative.  Negative for chest pain and palpitations.   Gastrointestinal: Negative.  Negative for heartburn and nausea.   Genitourinary: Negative.  Negative for dysuria.   Musculoskeletal: Negative.  Negative for myalgias.   Skin: Negative.  Negative for rash.   Neurological: Negative.  Negative for dizziness, tingling and headaches.   Endo/Heme/Allergies: Negative.  Does not bruise/bleed easily.    Psychiatric/Behavioral:  Positive for depression. Negative for suicidal ideas. The patient is nervous/anxious.    All other systems reviewed and are negative.      Physical Exam:  Physical Exam  Vitals reviewed.   Constitutional:       Appearance: Normal appearance.   HENT:      Head: Normocephalic.   Eyes:      Extraocular Movements: Extraocular movements intact.   Neurological:      Mental Status: He is alert and oriented to person, place, and time.   Psychiatric:         Mood and Affect: Mood normal.         Thought Content: Thought content normal.         Judgment: Judgment normal.         Labs:  No results found for this or any previous visit (from the past 24 hours).    Hemodynamics:  No data recorded.     Pulse  Av  Min: 75  Max: 75  Blood Pressure: 122/68     Respiratory:    Pulse Oximetry: 95 %           Fluids:  No intake or output data in the 24 hours ending 25 1448  Weight: 116 kg (255 lb)  GI/Nutrition:  No orders of the defined types were placed in this encounter.    Medications:  Current Outpatient Medications   Medication    zolpidem (AMBIEN) 5 MG Tab    atorvastatin (LIPITOR) 10 MG Tab    metFORMIN (GLUCOPHAGE) 500 MG Tab    Tirzepatide 5 MG/0.5ML Solution Auto-injector    DULoxetine (CYMBALTA) 30 MG Cap DR Particles    DULoxetine (CYMBALTA) 60 MG Cap DR Particles delayed-release capsule    lamotrigine (LAMICTAL) 200 MG tablet    hydrOXYzine pamoate (VISTARIL) 25 MG Cap    ibuprofen (MOTRIN) 600 MG Tab    Continuous Glucose Sensor (FREESTYLE FALLON 3 SENSOR) Misc     No current facility-administered medications for this encounter.     Medical Decision Making, by Problem:  There are no active hospital problems to display for this patient.    Plan:    Referred back to therapist to work on MDD and ZULMA

## 2025-08-07 ENCOUNTER — TELEMEDICINE (OUTPATIENT)
Dept: BEHAVIORAL HEALTH | Facility: CLINIC | Age: 40
End: 2025-08-07
Payer: COMMERCIAL

## 2025-08-07 DIAGNOSIS — F33.2 SEVERE EPISODE OF RECURRENT MAJOR DEPRESSIVE DISORDER, WITHOUT PSYCHOTIC FEATURES (HCC): Primary | ICD-10-CM

## 2025-08-07 DIAGNOSIS — F41.1 GENERALIZED ANXIETY DISORDER: ICD-10-CM

## 2025-08-07 PROCEDURE — 90832 PSYTX W PT 30 MINUTES: CPT | Mod: 95 | Performed by: MARRIAGE & FAMILY THERAPIST

## 2025-08-13 DIAGNOSIS — F33.2 SEVERE EPISODE OF RECURRENT MAJOR DEPRESSIVE DISORDER, WITHOUT PSYCHOTIC FEATURES (HCC): ICD-10-CM

## 2025-08-13 DIAGNOSIS — F41.1 GAD (GENERALIZED ANXIETY DISORDER): ICD-10-CM

## 2025-08-13 RX ORDER — DULOXETIN HYDROCHLORIDE 60 MG/1
CAPSULE, DELAYED RELEASE ORAL
Qty: 30 CAPSULE | Refills: 1 | Status: SHIPPED | OUTPATIENT
Start: 2025-08-13 | End: 2025-08-15 | Stop reason: SDUPTHER

## 2025-08-15 ENCOUNTER — TELEMEDICINE (OUTPATIENT)
Dept: BEHAVIORAL HEALTH | Facility: CLINIC | Age: 40
End: 2025-08-15
Payer: COMMERCIAL

## 2025-08-15 DIAGNOSIS — F41.1 GAD (GENERALIZED ANXIETY DISORDER): ICD-10-CM

## 2025-08-15 DIAGNOSIS — G47.09 OTHER INSOMNIA: Primary | ICD-10-CM

## 2025-08-15 DIAGNOSIS — F33.0 MILD EPISODE OF RECURRENT MAJOR DEPRESSIVE DISORDER (HCC): ICD-10-CM

## 2025-08-15 PROCEDURE — 99214 OFFICE O/P EST MOD 30 MIN: CPT | Mod: 95 | Performed by: PSYCHIATRY & NEUROLOGY

## 2025-08-15 RX ORDER — ZOLPIDEM TARTRATE 10 MG/1
10 TABLET ORAL NIGHTLY PRN
Qty: 30 TABLET | Refills: 2 | Status: SHIPPED | OUTPATIENT
Start: 2025-08-15 | End: 2025-09-14

## 2025-08-15 RX ORDER — LAMOTRIGINE 200 MG/1
200 TABLET ORAL DAILY
Qty: 90 TABLET | Refills: 1 | Status: SHIPPED | OUTPATIENT
Start: 2025-08-15

## 2025-08-15 RX ORDER — HYDROXYZINE PAMOATE 25 MG/1
25 CAPSULE ORAL 2 TIMES DAILY PRN
Qty: 60 CAPSULE | Refills: 2 | Status: SHIPPED | OUTPATIENT
Start: 2025-08-15

## 2025-08-15 RX ORDER — DULOXETIN HYDROCHLORIDE 60 MG/1
120 CAPSULE, DELAYED RELEASE ORAL DAILY
Qty: 180 CAPSULE | Refills: 1 | Status: SHIPPED | OUTPATIENT
Start: 2025-08-15

## 2025-08-15 ASSESSMENT — PATIENT HEALTH QUESTIONNAIRE - PHQ9
3. TROUBLE FALLING OR STAYING ASLEEP OR SLEEPING TOO MUCH: 1
2. FEELING DOWN, DEPRESSED, IRRITABLE, OR HOPELESS: 1
1. LITTLE INTEREST OR PLEASURE IN DOING THINGS: NOT AT ALL
2. FEELING DOWN, DEPRESSED, IRRITABLE, OR HOPELESS: SEVERAL DAYS
SUM OF ALL RESPONSES TO PHQ QUESTIONS 1-9: 9
7. TROUBLE CONCENTRATING ON THINGS, SUCH AS READING THE NEWSPAPER OR WATCHING TELEVISION: SEVERAL DAYS
CLINICAL INTERPRETATION OF PHQ2 SCORE: 0
4. FEELING TIRED OR HAVING LITTLE ENERGY: 1
8. MOVING OR SPEAKING SO SLOWLY THAT OTHER PEOPLE COULD HAVE NOTICED. OR THE OPPOSITE, BEING SO FIGETY OR RESTLESS THAT YOU HAVE BEEN MOVING AROUND A LOT MORE THAN USUAL: NOT AT ALL
4. FEELING TIRED OR HAVING LITTLE ENERGY: SEVERAL DAYS
5. POOR APPETITE OR OVEREATING: 0
10. IF YOU CHECKED OFF ANY PROBLEMS, HOW DIFFICULT HAVE THESE PROBLEMS MADE IT FOR YOU TO DO YOUR WORK, TAKE CARE OF THINGS AT HOME, OR GET ALONG WITH OTHER PEOPLE: SOMEWHAT DIFFICULT
7. TROUBLE CONCENTRATING ON THINGS, SUCH AS READING THE NEWSPAPER OR WATCHING TELEVISION: 1
3. TROUBLE FALLING OR STAYING ASLEEP OR SLEEPING TOO MUCH: SEVERAL DAYS
9. THOUGHTS THAT YOU WOULD BE BETTER OFF DEAD, OR OF HURTING YOURSELF: 3
8. MOVING OR SPEAKING SO SLOWLY THAT OTHER PEOPLE COULD HAVE NOTICED. OR THE OPPOSITE, BEING SO FIGETY OR RESTLESS THAT YOU HAVE BEEN MOVING AROUND A LOT MORE THAN USUAL: 0
5. POOR APPETITE OR OVEREATING: 0 - NOT AT ALL
6. FEELING BAD ABOUT YOURSELF - OR THAT YOU ARE A FAILURE OR HAVE LET YOURSELF OR YOUR FAMILY DOWN: MORE THAN HALF THE DAYS
1. LITTLE INTEREST OR PLEASURE IN DOING THINGS: 0
5. POOR APPETITE OR OVEREATING: NOT AT ALL
6. FEELING BAD ABOUT YOURSELF - OR THAT YOU ARE A FAILURE OR HAVE LET YOURSELF OR YOUR FAMILY DOWN: 2
9. THOUGHTS THAT YOU WOULD BE BETTER OFF DEAD, OR OF HURTING YOURSELF: NEARLY EVERY DAY

## 2025-08-15 ASSESSMENT — ANXIETY QUESTIONNAIRES
6. BECOMING EASILY ANNOYED OR IRRITABLE: SEVERAL DAYS
6. BECOMING EASILY ANNOYED OR IRRITABLE: SEVERAL DAYS
4. TROUBLE RELAXING: MORE THAN HALF THE DAYS
7. FEELING AFRAID AS IF SOMETHING AWFUL MIGHT HAPPEN: MORE THAN HALF THE DAYS
7. FEELING AFRAID AS IF SOMETHING AWFUL MIGHT HAPPEN: MORE THAN HALF THE DAYS
2. NOT BEING ABLE TO STOP OR CONTROL WORRYING: SEVERAL DAYS
3. WORRYING TOO MUCH ABOUT DIFFERENT THINGS: SEVERAL DAYS
5. BEING SO RESTLESS THAT IT IS HARD TO SIT STILL: NOT AT ALL
1. FEELING NERVOUS, ANXIOUS, OR ON EDGE: SEVERAL DAYS
2. NOT BEING ABLE TO STOP OR CONTROL WORRYING: SEVERAL DAYS
5. BEING SO RESTLESS THAT IT IS HARD TO SIT STILL: NOT AT ALL
4. TROUBLE RELAXING: MORE THAN HALF THE DAYS
1. FEELING NERVOUS, ANXIOUS, OR ON EDGE: SEVERAL DAYS
IF YOU CHECKED OFF ANY PROBLEMS ON THIS QUESTIONNAIRE, HOW DIFFICULT HAVE THESE PROBLEMS MADE IT FOR YOU TO DO YOUR WORK, TAKE CARE OF THINGS AT HOME, OR GET ALONG WITH OTHER PEOPLE: SOMEWHAT DIFFICULT
GAD7 TOTAL SCORE: 8
IF YOU CHECKED OFF ANY PROBLEMS ON THIS QUESTIONNAIRE, HOW DIFFICULT HAVE THESE PROBLEMS MADE IT FOR YOU TO DO YOUR WORK, TAKE CARE OF THINGS AT HOME, OR GET ALONG WITH OTHER PEOPLE: SOMEWHAT DIFFICULT
3. WORRYING TOO MUCH ABOUT DIFFERENT THINGS: SEVERAL DAYS

## 2025-08-21 ENCOUNTER — APPOINTMENT (OUTPATIENT)
Dept: BEHAVIORAL HEALTH | Facility: CLINIC | Age: 40
End: 2025-08-21
Payer: COMMERCIAL

## 2025-09-11 ENCOUNTER — APPOINTMENT (OUTPATIENT)
Dept: BEHAVIORAL HEALTH | Facility: CLINIC | Age: 40
End: 2025-09-11
Payer: COMMERCIAL